# Patient Record
Sex: MALE | Race: WHITE | NOT HISPANIC OR LATINO | Employment: OTHER | URBAN - METROPOLITAN AREA
[De-identification: names, ages, dates, MRNs, and addresses within clinical notes are randomized per-mention and may not be internally consistent; named-entity substitution may affect disease eponyms.]

---

## 2017-01-04 ENCOUNTER — TELEPHONE (OUTPATIENT)
Dept: NEUROLOGY | Facility: CLINIC | Age: 67
End: 2017-01-04

## 2017-01-04 DIAGNOSIS — R13.12 OROPHARYNGEAL DYSPHAGIA: ICD-10-CM

## 2017-01-04 NOTE — TELEPHONE ENCOUNTER
----- Message from Kali Goldsmith MA sent at 1/4/2017  2:16 PM CST -----      ----- Message -----     From: Sarah Coles     Sent: 1/4/2017   2:05 PM       To: Lena MCKEE Staff    [facility] /requesting a modified barium swallow order (scheduled for 01/11/17)  faxed [phone]/ karen Cochran / [phone]

## 2017-01-11 ENCOUNTER — TELEPHONE (OUTPATIENT)
Dept: NEUROLOGY | Facility: CLINIC | Age: 67
End: 2017-01-11

## 2017-01-11 NOTE — TELEPHONE ENCOUNTER
----- Message from Geeta López sent at 1/11/2017  9:28 AM CST -----  Contact: [facility]- [place]-   [phone]  The nurse need additional information for speech therapy for the above listed patient. Fax [phone]!

## 2017-01-11 NOTE — TELEPHONE ENCOUNTER
----- Message from Sarah Coles sent at 1/10/2017  3:54 PM CST -----  Pt requesting to speak to nurse about orders for Barium swallow test / call Marissa [phone] ... [facility] / will need orders

## 2017-04-06 ENCOUNTER — OFFICE VISIT (OUTPATIENT)
Dept: NEUROLOGY | Facility: CLINIC | Age: 67
End: 2017-04-06
Payer: MEDICARE

## 2017-04-06 VITALS
WEIGHT: 155.44 LBS | BODY MASS INDEX: 23.02 KG/M2 | DIASTOLIC BLOOD PRESSURE: 91 MMHG | HEIGHT: 69 IN | SYSTOLIC BLOOD PRESSURE: 143 MMHG | HEART RATE: 61 BPM

## 2017-04-06 DIAGNOSIS — R47.1 DYSARTHRIA: ICD-10-CM

## 2017-04-06 DIAGNOSIS — R13.12 OROPHARYNGEAL DYSPHAGIA: ICD-10-CM

## 2017-04-06 DIAGNOSIS — G20.A1 PD (PARKINSON'S DISEASE): Primary | ICD-10-CM

## 2017-04-06 DIAGNOSIS — R41.3 MEMORY LOSS: ICD-10-CM

## 2017-04-06 PROCEDURE — 99999 PR PBB SHADOW E&M-EST. PATIENT-LVL III: CPT | Mod: PBBFAC,,, | Performed by: PSYCHIATRY & NEUROLOGY

## 2017-04-06 PROCEDURE — 99213 OFFICE O/P EST LOW 20 MIN: CPT | Mod: PBBFAC,PN | Performed by: PSYCHIATRY & NEUROLOGY

## 2017-04-06 PROCEDURE — 99214 OFFICE O/P EST MOD 30 MIN: CPT | Mod: S$PBB,,, | Performed by: PSYCHIATRY & NEUROLOGY

## 2017-04-06 RX ORDER — BUDESONIDE AND FORMOTEROL FUMARATE DIHYDRATE 80; 4.5 UG/1; UG/1
2 AEROSOL RESPIRATORY (INHALATION) 2 TIMES DAILY
COMMUNITY
Start: 2017-01-30 | End: 2021-06-16

## 2017-04-06 RX ORDER — CARBIDOPA AND LEVODOPA 50; 200 MG/1; MG/1
1 TABLET, EXTENDED RELEASE ORAL 4 TIMES DAILY
Qty: 360 TABLET | Refills: 3 | Status: SHIPPED | OUTPATIENT
Start: 2017-04-06 | End: 2017-04-06 | Stop reason: SDUPTHER

## 2017-04-06 RX ORDER — BENZTROPINE MESYLATE 0.5 MG/1
0.25 TABLET ORAL 2 TIMES DAILY
Qty: 90 TABLET | Refills: 4 | Status: SHIPPED | OUTPATIENT
Start: 2017-04-06 | End: 2017-10-06 | Stop reason: SDUPTHER

## 2017-04-06 RX ORDER — CARBIDOPA AND LEVODOPA 50; 200 MG/1; MG/1
1 TABLET, EXTENDED RELEASE ORAL 4 TIMES DAILY
Qty: 360 TABLET | Refills: 3 | Status: SHIPPED | OUTPATIENT
Start: 2017-04-06 | End: 2017-06-03 | Stop reason: SDUPTHER

## 2017-04-06 RX ORDER — PNV NO.95/FERROUS FUM/FOLIC AC 28MG-0.8MG
100 TABLET ORAL DAILY
COMMUNITY
End: 2017-07-05

## 2017-04-06 NOTE — PROGRESS NOTES
"Last Name: Sunita LOMBARDO (pe-teet). Chief Complaints during this visit:   f/u visit for PD     History of present illness:   67 y.o. M returns in f/u for PD.  Accompanied by wife. He states that overall, he is feeling fair. Tremor is not bad but balance seems to be doing worse. Went to physical therapy this morning. Has order to continue for another 8 weeks.   Memory is not doing to good. Having difficulty remembering peoples names and addresses.   Having some trouble sleeping too. Hard to fall asleep.   Dropping things and having touble buttoning buttons   Gets an occasional scary look in his eyes.  He and his wife state they never got the results of his swallowing study  Still having persistent tongue protrusion.        Interval history 1/4/17:  No complaints.  His main complaint is fatigue and also "feeling fatigue" when he gets out in the sun.  No falls since last seen.  Stumbles, but no falls.  Sleeping "oK', though he lays in bed awake for a couple hours.  Sleeps better in easy chair.  Gave his guitar away as was too hard to play.  Has more trouble making decisions.  Has cravings for sweets.      Interval history 8/5/16:  Never took the remeron because they didn't want to take more medication.  Tremors come/go.  Weight is back up.  Goes to therapy 3x/week.  If he doesn't he gets too stiff.  Back pain, daily.  Relieved by sitting down.  Easily fatigued.    From my note 4/8/16:  Continues finds the sinemet CR better for him, better control of tremors.  Balance still an issue, but no falls.  Reluctant to use a cane.  Difficult to fall asleep at night.  Active dreaming.  No memory problems and no hallucinations.    Has episodes of staring.    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  Lost some weight.  During month in hospital, though, he was not shaking.  Now they've come back "twice as bad."    Oxycodone made him hallucinate, so stopped after a week (during hospitalization).      II. " "Review of systems As in HPI, otherwise, balance 3  systems reviewed and are negative.   III. Past Medical history: PD left tremor-type 2009 (Dr. Ramos dx); "CVA" 1998; Right shoulder dyskinesia vs tic since at least 1994; COPD; HTN   Family history: brother with tics?   Social history: No tob or etoh, lives with family, , disabled .  Enjoys hunting and fishing.    Current neuro medications: benztropine 0.5mg BID, amantadine 100qam, carbidopa-levodopa 50/200 tid  Allergies: penicillin, mirapex caused compulsive gambling     IV. Physical Exam (Includes Part III of Unified Parkinsons Disease Rating Scale 2008)       Vitals:    04/06/17 1548   BP: (!) 143/91   BP Location: Left arm   Patient Position: Sitting   BP Method: Automatic   Pulse: 61   Weight: 70.5 kg (155 lb 6.8 oz)   Height: 5' 9" (1.753 m)     Wt Readings from Last 5 Encounters:   04/06/17 70.5 kg (155 lb 6.8 oz)   12/01/16 71 kg (156 lb 8.4 oz)   08/05/16 70 kg (154 lb 5.2 oz)   04/08/16 70 kg (154 lb 5.2 oz)   11/17/15 69.1 kg (152 lb 5.4 oz)     General appearance: Well nourished, well developed, no acute distress   -------------------------------------------------------------   Facial Expression: "poker face" (1)   Affect: full   Orientation to time & place: Oriented to time, place, person and situation   Speech: Slight loss of expression, diction or tone (1)     UPDRS Motor Examination      Speech  1 - Slight loss of expression, dictation, and/or volumn.   Facial Expression  2 - Slight but definitely abnormal diminution of facial expression.    Rigidity     Neck 1 - Slight or detectable only when activated by mirror or other movements.   Upper Extremity: Right 0 - Absent.   Upper Extremity: Left 1 - Slight or detectable only when activated by mirror or other movements.   Lower Extremity: Right 0 - Absent.   Lower Extremity: Left 0 - Absent.     Finger Taps      right 1 - Mild slowing and/or reduction in amplitude.   left 2 - Moderately " impaired. Definite and early fatiguing. May have occasional arrests in movement.   Hand Movements      right 1 - Mild slowing and/or reduction in amplitude.   left 2 - Moderately impaired. Definite and early fatiguing. May have occasional arrests in movement.   Pronation/supination of Hands      right 1 - Mild slowing and/or reduction in amplitude.   left 2 - Moderately impaired. Definite and early fatiguing. May have occasional arrests in movement.     Toe Tapping    right 1 - Mild slowing and/or reduction in amplitude.   left 2 - Moderately impaired. Definite and early fatiguing. May have occasional arrests in movement.     Leg Agility      right 1 - Mild slowing and/or reduction in amplitude.   left 2 - Moderately impaired. Definite and early fatiguing. May have occasional arrests in movement.   Arising from Chair  2 - Pushes self up from arms of seat.   Posture  1 - Not quite erect, slightly stooped posture; could be normal for older person.   Gait  2 - Walks with difficulty, but requires little or no assistance; may have some festination, short steps, or propulsion.   Freezing of gait 0   Postural Stability (Response to sudden, strong posterior displacement produced by pull on shoulders while patient erect with eyes open and feet slightly apart.   Deferred   Body Bradykinesia and Hypokinesia (Combining slowness, hesitancy, decreased armswing, small amplitude, and poverty of movement in general)  2 - Mild degree of slowness and poverty of movement which is definitely abnormal.     Tremor at Rest:      Face, lips, chin 0 - Absent.    Hands:      right 0 - Absent.    left 2 - Mild in amplitude and persistent. Or moderate in amplitude, but only intermittently present.    Feet:     right 0 - Absent.    left 0 - Absent.    Constancy of REST tremor: None   Postural tremor:    right 0 - Absent.    left 0 - Absent.    Kinetic tremor:    right 0 - Absent.    left 0 - Absent.    Dyskinesias present? No       Total Motor  UPDRS:  29        V. Laboratory/ Radiological Data:     Lab Results   Component Value Date    [id number] 304 04/08/2016     B1 61  spep normal    VI. Assessment and Plan    Problem List Items Addressed This Visit     PD (Parkinson's disease) - Primary    Overview     classic type, left-predominant tremor         Current Assessment & Plan     Continues to appear under-dosed.   -> increase sinemet to qid (6/11/3/7)   -> not a dbs candidate as no indications (wife asked)         Relevant Medications    benztropine (COGENTIN) 0.5 MG tablet    carbidopa-levodopa  mg (SINEMET CR)  mg TbSR    Oropharyngeal dysphagia    Relevant Orders    Ambulatory consult to Speech Therapy    Memory loss    Dysarthria    Current Assessment & Plan     Never got the ST that I ordered.   -> re-order ST through [facility] in [place].fax [phone]                 Return in about 3 months (around 7/6/2017) for PD.

## 2017-04-06 NOTE — ASSESSMENT & PLAN NOTE
Continues to appear under-dosed.   -> increase sinemet to qid (6/11/3/7)   -> not a dbs candidate as no indications (wife asked)

## 2017-04-06 NOTE — PATIENT INSTRUCTIONS
04/06/2017    Dear Yannick Dorsey,    Due to overwhelming demand, my [place] clinic location books to capacity very quickly every month.      We have two Nurse Practitioners, Ngozi Julian and Asya Selby, who also see patients with Memory and Movement Disorders in [place].  And when possible, please consider making your appointment in [place], where we have more appointments available in the Movements Disorders Division.       I'd like to see you, again, in either July or August.  However, we have no available appointments at this time.      Please CALL my office in [place] if you have NOT heard from us before June 1, 2017.      Sincerely,       Cassia Paredes MD  Director, Movement Disorders and DBS Program  Department of Neurology  [phone]

## 2017-04-06 NOTE — ASSESSMENT & PLAN NOTE
Never got the ST that I ordered.   -> re-order ST through [facility] in [place].fax [phone]

## 2017-04-06 NOTE — MR AVS SNAPSHOT
[facility]  [address]  [place] [address]  Phone: [phone]  Fax: [phone]                  Yannick Dorsey   2017 3:40 PM   Office Visit    Description:  Male : 1950   Provider:  Cassia Paredes MD   Department:  [place] - Neurology           Reason for Visit     Parkinson's Disease           Diagnoses this Visit        Comments    Oropharyngeal dysphagia    -  Primary     PD (Parkinson's disease)                To Do List           Goals (5 Years of Data)     None      Follow-Up and Disposition     Return in about 3 months (around 2017) for PD.       These Medications        Disp Refills Start End    benztropine (COGENTIN) 0.5 MG tablet 90 tablet 4 2017     Take 0.5 tablets (0.25 mg total) by mouth 2 (two) times daily. - Oral    Pharmacy: [facility] [id number] [place] [address] AT [facility] Ph #: [phone]       carbidopa-levodopa  mg (SINEMET CR)  mg TbSR 360 tablet 3 2017     Take 1 tablet by mouth 4 (four) times daily. - Oral    Pharmacy: [facility] [id number] [place] [address] AT [facility] Ph #: [phone]         [facility]     [facility] Nurse Care Line -  Assistance  Unless otherwise directed by your provider, please contact [facility], our nurse care line that is available for  assistance.     Registered nurses in the [facility] provide: appointment scheduling, clinical advisement, health education, and other advisory services.  Call: [phone] (toll free)               Medications           Message regarding Medications     Verify the changes and/or additions to your medication regime listed below are the same as discussed with your clinician today.  If any of these changes or additions are incorrect, please notify your healthcare provider.        CHANGE how you are taking these  medications     Start Taking Instead of    benztropine (COGENTIN) 0.5 MG tablet benztropine (COGENTIN) 0.5 MG tablet    Dosage:  Take 0.5 tablets (0.25 mg total) by mouth 2 (two) times daily. Dosage:  Take 1 tablet by mouth  twice a day    Reason for Change:  Reorder     carbidopa-levodopa  mg (SINEMET CR)  mg TbSR carbidopa-levodopa  mg (SINEMET CR)  mg TbSR    Dosage:  Take 1 tablet by mouth 4 (four) times daily. Dosage:  Take 1 tablet by mouth 3 (three) times daily.    Reason for Change:  Reorder       STOP taking these medications     mirtazapine (REMERON) 15 MG tablet Take 1 tablet (15 mg total) by mouth every evening.           Verify that the below list of medications is an accurate representation of the medications you are currently taking.  If none reported, the list may be blank. If incorrect, please contact your healthcare provider. Carry this list with you in case of emergency.           Current Medications     amantadine HCl 100 mg Tab Take 1 tablet by mouth  daily    aspirin (ECOTRIN) 81 MG EC tablet Take 81 mg by mouth once daily.    benztropine (COGENTIN) 0.5 MG tablet Take 0.5 tablets (0.25 mg total) by mouth 2 (two) times daily.    carbidopa-levodopa  mg (SINEMET CR)  mg TbSR Take 1 tablet by mouth 4 (four) times daily.    cyanocobalamin (VITAMIN B-12) 100 MCG tablet Take 100 mcg by mouth once daily.    fluticasone-salmeterol (ADVAIR DISKUS) 250-50 mcg/dose diskus inhaler Inhale 1 puff into the lungs 2 (two) times daily. Disk with Device Inhalation    hydrALAZINE (APRESOLINE) 100 MG tablet Take 100 mg by mouth 2 (two) times daily. Take 0.5 tablet BID    PROAIR HFA 90 mcg/actuation inhaler Inhale into the lungs 4 times daily as needed.    vitamin D 1000 units Tab Take 185 mg by mouth once daily.    SYMBICORT 80-4.5 mcg/actuation HFAA            Clinical Reference Information           Your Vitals Were     BP Pulse Height Weight BMI    143/91 (BP Location: Left  "arm, Patient Position: Sitting, BP Method: Automatic) 61 5' 9" (1.753 m) 70.5 kg (155 lb 6.8 oz) 22.95 kg/m2      Blood Pressure          Most Recent Value    BP  (!)  143/91      Allergies as of 4/6/2017     Penicillins    Pramipexole      Immunizations Administered on Date of Encounter - 4/6/2017     None      Orders Placed During Today's Visit      Normal Orders This Visit    Ambulatory consult to Speech Therapy       Instructions    04/06/2017    Dear Yannick Dorsey,    Due to overwhelming demand, my [place] clinic location books to capacity very quickly every month.      We have two Nurse Practitioners, Ngozi Julian and Asya Selby, who also see patients with Memory and Movement Disorders in [place].  And when possible, please consider making your appointment in [place], where we have more appointments available in the Movements Disorders Division.       I'd like to see you, again, in either July or August.  However, we have no available appointments at this time.      Please CALL my office in [place] if you have NOT heard from us before June 1, 2017.      Sincerely,       Cassia Paredes MD  Director, Movement Disorders and DBS Program  Department of Neurology  [phone]           Language Assistance Services     ATTENTION: Language assistance services are available, free of charge. Please call [phone].      ATENCIÓN: Si habla louann, tiene a pete disposición servicios gratuitos de asistencia lingüística. Llame al [phone].     [facility] Ý: N?u b?n nói Ti?ng Vi?t, có các d?ch v? h? tr? ngôn ng? mi?n phí dành cho b?n. G?i s? [phone].         [facility] complies with applicable Federal civil rights laws and does not discriminate on the basis of race, color, national origin, age, disability, or sex.        "

## 2017-06-02 DIAGNOSIS — G20.A1 PD (PARKINSON'S DISEASE): ICD-10-CM

## 2017-06-03 RX ORDER — CARBIDOPA AND LEVODOPA 50; 200 MG/1; MG/1
1 TABLET, EXTENDED RELEASE ORAL 4 TIMES DAILY
Qty: 360 TABLET | Refills: 3 | Status: SHIPPED | OUTPATIENT
Start: 2017-06-03 | End: 2018-06-28 | Stop reason: SDUPTHER

## 2017-06-03 RX ORDER — CARBIDOPA AND LEVODOPA 50; 200 MG/1; MG/1
TABLET, EXTENDED RELEASE ORAL
Qty: 270 TABLET | OUTPATIENT
Start: 2017-06-03

## 2017-06-07 ENCOUNTER — TELEPHONE (OUTPATIENT)
Dept: CARDIOLOGY | Facility: CLINIC | Age: 67
End: 2017-06-07

## 2017-06-07 ENCOUNTER — TELEPHONE (OUTPATIENT)
Dept: NEUROLOGY | Facility: CLINIC | Age: 67
End: 2017-06-07

## 2017-06-07 NOTE — TELEPHONE ENCOUNTER
----- Message from Mya Frederick sent at 6/7/2017  4:07 PM CDT -----  Contact: Marissa Rico   Wife called regarding the patient medication, benztropine (COGENTIN) 0.5 MG tablet, need to verify dosage. Please contact [phone] (home)

## 2017-06-07 NOTE — TELEPHONE ENCOUNTER
Advised Ms. Marissa that the patient is currently scheduled for [place] with Dr. Paredes on 7/5/17 @ 2:20pm. We also confirmed that Mr. Lanier should be taking 1/2 tablet of the 0.5 mg cogentin tablet in the morning and the other half at night. The dividing of the tablet into 0.25mg doses was causing concern for Ms. Marissa of the accuracy of how he had been taking the med.

## 2017-06-07 NOTE — TELEPHONE ENCOUNTER
----- Message from Mya Frederick sent at 6/7/2017  4:11 PM CDT -----  Contact: Marissa Dorsey  Wife called regarding scheduling the patient a f/u appt in [place]. Please contact [phone] (home)

## 2017-07-05 ENCOUNTER — OFFICE VISIT (OUTPATIENT)
Dept: NEUROLOGY | Facility: CLINIC | Age: 67
End: 2017-07-05
Payer: MEDICARE

## 2017-07-05 VITALS
WEIGHT: 156.75 LBS | BODY MASS INDEX: 23.22 KG/M2 | DIASTOLIC BLOOD PRESSURE: 82 MMHG | HEART RATE: 57 BPM | SYSTOLIC BLOOD PRESSURE: 132 MMHG | HEIGHT: 69 IN

## 2017-07-05 DIAGNOSIS — R26.89 BALANCE DISORDER: ICD-10-CM

## 2017-07-05 DIAGNOSIS — F51.01 PRIMARY INSOMNIA: ICD-10-CM

## 2017-07-05 DIAGNOSIS — G20.A1 PD (PARKINSON'S DISEASE): Primary | ICD-10-CM

## 2017-07-05 PROCEDURE — 99214 OFFICE O/P EST MOD 30 MIN: CPT | Mod: S$PBB,,, | Performed by: PSYCHIATRY & NEUROLOGY

## 2017-07-05 PROCEDURE — 99213 OFFICE O/P EST LOW 20 MIN: CPT | Mod: PBBFAC,PN | Performed by: PSYCHIATRY & NEUROLOGY

## 2017-07-05 PROCEDURE — 99999 PR PBB SHADOW E&M-EST. PATIENT-LVL III: CPT | Mod: PBBFAC,,, | Performed by: PSYCHIATRY & NEUROLOGY

## 2017-07-05 RX ORDER — HYDRALAZINE HYDROCHLORIDE 25 MG/1
25 TABLET, FILM COATED ORAL 2 TIMES DAILY
COMMUNITY
Start: 2017-06-02 | End: 2018-02-26

## 2017-07-05 RX ORDER — OMEPRAZOLE 20 MG/1
20 CAPSULE, DELAYED RELEASE ORAL DAILY
COMMUNITY
Start: 2017-06-02 | End: 2018-02-26

## 2017-07-05 RX ORDER — DIAZEPAM 2 MG/1
1 TABLET ORAL NIGHTLY PRN
Qty: 15 TABLET | Refills: 0 | Status: SHIPPED | OUTPATIENT
Start: 2017-07-05 | End: 2018-02-26

## 2017-07-05 NOTE — PROGRESS NOTES
"Last Name: Sunita LOMBARDO (pe-teet). Chief Complaints during this visit:   f/u visit for PD     History of present illness:   67 y.o. M returns in f/u for PD.  Accompanied by wife.  Taking meds regular.  Main complaint is his sleep.  If he sleeps poorly, then he is spacey the next day.    Balance is off, but no falls.      Interval history 4/6/17:  He states that overall, he is feeling fair. Tremor is not bad but balance seems to be doing worse. Went to physical therapy this morning. Has order to continue for another 8 weeks.   Memory is not doing to good. Having difficulty remembering peoples names and addresses.   Having some trouble sleeping too. Hard to fall asleep.   Dropping things and having touble buttoning buttons   Gets an occasional scary look in his eyes.  He and his wife state they never got the results of his swallowing study  Still having persistent tongue protrusion.      Interval history 1/4/17:  No complaints.  His main complaint is fatigue and also "feeling fatigue" when he gets out in the sun.  No falls since last seen.  Stumbles, but no falls.  Sleeping "oK', though he lays in bed awake for a couple hours.  Sleeps better in easy chair.  Gave his guitar away as was too hard to play.  Has more trouble making decisions.  Has cravings for sweets.    Interval history 8/5/16:  Never took the remeron because they didn't want to take more medication.  Tremors come/go.  Weight is back up.  Goes to therapy 3x/week.  If he doesn't he gets too stiff.  Back pain, daily.  Relieved by sitting down.  Easily fatigued.    From my note 4/8/16:  Continues finds the sinemet CR better for him, better control of tremors.  Balance still an issue, but no falls.  Reluctant to use a cane.  Difficult to fall asleep at night.  Active dreaming.  No memory problems and no hallucinations.    Has episodes of staring.    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  Lost some weight.  During month in " "hospital, though, he was not shaking.  Now they've come back "twice as bad."    Oxycodone made him hallucinate, so stopped after a week (during hospitalization).      II. Review of systems As in HPI, otherwise, balance 3  systems reviewed and are negative.   III. Past Medical history: PD left tremor-type 2009 (Dr. Ramos dx); "CVA" 1998; Right shoulder dyskinesia vs tic since at least 1994; COPD; HTN   Family history: brother with tics?   Social history: No tob or etoh, lives with family, , disabled .  Enjoys hunting and fishing.    Current neuro medications: benztropine 0.5mg BID, amantadine 100qam, carbidopa-levodopa 50/200 tid  Allergies: penicillin, mirapex caused compulsive gambling     IV. Physical Exam (Includes Part III of Unified Parkinsons Disease Rating Scale 2008)       Vitals:    07/05/17 1428   BP: 132/82   BP Location: Left arm   Patient Position: Sitting   BP Method: Automatic   Pulse: (!) 57   Weight: 71.1 kg (156 lb 12 oz)   Height: 5' 9" (1.753 m)     Wt Readings from Last 5 Encounters:   07/05/17 71.1 kg (156 lb 12 oz)   04/06/17 70.5 kg (155 lb 6.8 oz)   12/01/16 71 kg (156 lb 8.4 oz)   08/05/16 70 kg (154 lb 5.2 oz)   04/08/16 70 kg (154 lb 5.2 oz)     General appearance: Well nourished, well developed, no acute distress   -------------------------------------------------------------   Facial Expression: "poker face" (1)   Affect: full   Orientation to time & place: Oriented to time, place, person and situation   Speech: Slight loss of expression, diction or tone (1)     UPDRS Motor Examination      Speech  1 - Slight loss of expression, dictation, and/or volumn.   Facial Expression  2 - Slight but definitely abnormal diminution of facial expression.    Rigidity     Neck 1 - Slight or detectable only when activated by mirror or other movements.   Upper Extremity: Right 0 - Absent.   Upper Extremity: Left 1 - Slight or detectable only when activated by mirror or other movements. "   Lower Extremity: Right 0 - Absent.   Lower Extremity: Left 0 - Absent.     Finger Taps      right 1 - Mild slowing and/or reduction in amplitude.   left 2 - Moderately impaired. Definite and early fatiguing. May have occasional arrests in movement.   Hand Movements      right 1 - Mild slowing and/or reduction in amplitude.   left 2 - Moderately impaired. Definite and early fatiguing. May have occasional arrests in movement.   Pronation/supination of Hands      right 1 - Mild slowing and/or reduction in amplitude.   left 2 - Moderately impaired. Definite and early fatiguing. May have occasional arrests in movement.     Toe Tapping    right 1 - Mild slowing and/or reduction in amplitude.   left 2 - Moderately impaired. Definite and early fatiguing. May have occasional arrests in movement.     Leg Agility      right 1 - Mild slowing and/or reduction in amplitude.   left 2 - Moderately impaired. Definite and early fatiguing. May have occasional arrests in movement.   Arising from Chair  2 - Pushes self up from arms of seat.   Posture  1 - Not quite erect, slightly stooped posture; could be normal for older person.   Gait  2 - Walks with difficulty, but requires little or no assistance; may have some festination, short steps, or propulsion.   Freezing of gait 0   Postural Stability (Response to sudden, strong posterior displacement produced by pull on shoulders while patient erect with eyes open and feet slightly apart.   Deferred   Body Bradykinesia and Hypokinesia (Combining slowness, hesitancy, decreased armswing, small amplitude, and poverty of movement in general)  2 - Mild degree of slowness and poverty of movement which is definitely abnormal.     Tremor at Rest:      Face, lips, chin 0 - Absent.    Hands:      right 0 - Absent.    left 2 - Mild in amplitude and persistent. Or moderate in amplitude, but only intermittently present.    Feet:     right 0 - Absent.    left 0 - Absent.    Constancy of REST tremor:  2: Mild: Tremor at rest is present 26-50% of the entire examination period.    Postural tremor:    right 0 - Absent.    left 0 - Absent.    Kinetic tremor:    right 0 - Absent.    left 0 - Absent.    Dyskinesias present? No       Total Motor UPDRS:  29        V. Laboratory/ Radiological Data:     Lab Results   Component Value Date    [id number] 304 04/08/2016     B1 61  spep normal    VI. Assessment and Plan    Problem List Items Addressed This Visit        1 - High    PD (Parkinson's disease) - Primary    Overview     classic type, left-predominant tremor         Current Assessment & Plan     Stable, actually looks really good today (appropriately dosed).   -> no changes to medicaitons            2     Primary insomnia    Current Assessment & Plan     Intermittent difficulty falling asleep.   -> start melatonin nightly and valium prn         Relevant Medications    diazePAM (VALIUM) 2 MG tablet    Balance disorder      Other Visit Diagnoses    None.           Return in about 3 months (around 10/5/2017) for PD.

## 2017-07-05 NOTE — PATIENT INSTRUCTIONS
Please try melatonin 5mg at night every night, for sleep.  You can also take a valium at 1am, if you are still awake.

## 2017-08-23 RX ORDER — BENZTROPINE MESYLATE 0.5 MG/1
TABLET ORAL
Qty: 180 TABLET | Refills: 3 | Status: SHIPPED | OUTPATIENT
Start: 2017-08-23 | End: 2017-10-06 | Stop reason: SDUPTHER

## 2017-09-20 ENCOUNTER — TELEPHONE (OUTPATIENT)
Dept: NEUROLOGY | Facility: CLINIC | Age: 67
End: 2017-09-20

## 2017-09-20 NOTE — TELEPHONE ENCOUNTER
----- Message from Sarah Rudolph sent at 9/20/2017 12:03 PM CDT -----  Contact: Marissa (Wife) [phone]   Marissa called to speak to someone regarding changing one of the the patient prescriptions. The patient is requesting to have the benztropine (COGENTIN) 0.5 MG tablet prescription change to a liquid if possible. There's zero co-pay for the liquid version.Please contact Marissa at [phone] or [phone] to discuss further.      [facility] - [address]  [address]  [facility] [address]  Phone: [phone] Fax: [phone]

## 2017-09-20 NOTE — TELEPHONE ENCOUNTER
Called and left a message for  regarding her 's medication. I stated to call our office back regarding this matter.

## 2017-10-06 ENCOUNTER — OFFICE VISIT (OUTPATIENT)
Dept: NEUROLOGY | Facility: CLINIC | Age: 67
End: 2017-10-06
Payer: MEDICARE

## 2017-10-06 VITALS
HEIGHT: 69 IN | SYSTOLIC BLOOD PRESSURE: 142 MMHG | BODY MASS INDEX: 23.6 KG/M2 | DIASTOLIC BLOOD PRESSURE: 87 MMHG | HEART RATE: 63 BPM | WEIGHT: 159.38 LBS

## 2017-10-06 DIAGNOSIS — R47.1 DYSARTHRIA: ICD-10-CM

## 2017-10-06 DIAGNOSIS — G20.A1 PD (PARKINSON'S DISEASE): Primary | ICD-10-CM

## 2017-10-06 PROCEDURE — 99213 OFFICE O/P EST LOW 20 MIN: CPT | Mod: PBBFAC,PN | Performed by: PSYCHIATRY & NEUROLOGY

## 2017-10-06 PROCEDURE — 99214 OFFICE O/P EST MOD 30 MIN: CPT | Mod: S$PBB,,, | Performed by: PSYCHIATRY & NEUROLOGY

## 2017-10-06 PROCEDURE — 99999 PR PBB SHADOW E&M-EST. PATIENT-LVL III: CPT | Mod: PBBFAC,,, | Performed by: PSYCHIATRY & NEUROLOGY

## 2017-10-06 RX ORDER — AMANTADINE HYDROCHLORIDE 50 MG/5ML
100 SOLUTION ORAL DAILY
Qty: 900 ML | Refills: 4 | Status: SHIPPED | OUTPATIENT
Start: 2017-10-06 | End: 2018-10-14 | Stop reason: SDUPTHER

## 2017-10-06 RX ORDER — BENZTROPINE MESYLATE 0.5 MG/1
0.5 TABLET ORAL 2 TIMES DAILY
Qty: 180 TABLET | Refills: 4 | Status: SHIPPED | OUTPATIENT
Start: 2017-10-06 | End: 2017-11-07 | Stop reason: SDUPTHER

## 2017-10-06 NOTE — PROGRESS NOTES
"Last Name: Sunita LOMBARDO (pe-teet). Chief Complaints during this visit:   f/u visit for PD     History of present illness:   67 y.o. M returns in f/u for PD.  Accompanied by wife.  Feels like he is doing ok, over-all, just losing a little more balance.  No falls.  Sleep is better.    Speech is getting harder to understand.  Insurer won't cover any more ST this year.  No choking.    Would like to switch to liquid amantadine as free!  Amantadine capsules are $90.    Interval history 7/5/17:  Taking meds regular.  Main complaint is his sleep.  If he sleeps poorly, then he is spacey the next day.  Balance is off, but no falls.    Interval history 4/6/17:  He states that overall, he is feeling fair. Tremor is not bad but balance seems to be doing worse. Went to physical therapy this morning. Has order to continue for another 8 weeks.   Memory is not doing to good. Having difficulty remembering peoples names and addresses.   Having some trouble sleeping too. Hard to fall asleep.   Dropping things and having touble buttoning buttons   Gets an occasional scary look in his eyes.  He and his wife state they never got the results of his swallowing study  Still having persistent tongue protrusion.      Interval history 1/4/17:  No complaints.  His main complaint is fatigue and also "feeling fatigue" when he gets out in the sun.  No falls since last seen.  Stumbles, but no falls.  Sleeping "oK', though he lays in bed awake for a couple hours.  Sleeps better in easy chair.  Gave his guitar away as was too hard to play.  Has more trouble making decisions.  Has cravings for sweets.    Interval history 8/5/16:  Never took the remeron because they didn't want to take more medication.  Tremors come/go.  Weight is back up.  Goes to therapy 3x/week.  If he doesn't he gets too stiff.  Back pain, daily.  Relieved by sitting down.  Easily fatigued.    From my note 4/8/16:  Continues finds the sinemet CR better for him, better control of " "tremors.  Balance still an issue, but no falls.  Reluctant to use a cane.  Difficult to fall asleep at night.  Active dreaming.  No memory problems and no hallucinations.    Has episodes of staring.    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  Lost some weight.  During month in hospital, though, he was not shaking.  Now they've come back "twice as bad."    Oxycodone made him hallucinate, so stopped after a week (during hospitalization).      II. Review of systems As in HPI, otherwise, balance 3  systems reviewed and are negative.   III. Past Medical history: PD left tremor-type 2009 (Dr. Ramos dx); "CVA" 1998; Right shoulder dyskinesia vs tic since at least 1994; COPD; HTN   Family history: brother with tics?   Social history: No tob or etoh, lives with family, , disabled .  Enjoys hunting and fishing.    Current neuro medications: benztropine 0.5mg BID, amantadine 100qam, carbidopa-levodopa 50/200 tid  Allergies: penicillin, mirapex caused compulsive gambling     IV. Physical Exam (Includes Part III of Unified Parkinsons Disease Rating Scale 2008)       Vitals:    10/06/17 1500   BP: (!) 142/87   BP Location: Left arm   Patient Position: Sitting   BP Method: Medium (Automatic)   Pulse: 63   Weight: 72.3 kg (159 lb 6.3 oz)   Height: 5' 9" (1.753 m)     Wt Readings from Last 5 Encounters:   10/06/17 72.3 kg (159 lb 6.3 oz)   07/05/17 71.1 kg (156 lb 12 oz)   04/06/17 70.5 kg (155 lb 6.8 oz)   12/01/16 71 kg (156 lb 8.4 oz)   08/05/16 70 kg (154 lb 5.2 oz)     General appearance: Well nourished, well developed, no acute distress   -------------------------------------------------------------   Facial Expression: "poker face" (1)   Affect: full   Orientation to time & place: Oriented to time, place, person and situation   Speech: Slight loss of expression, diction or tone (1)     UPDRS Motor Examination      Speech  1 - Slight loss of expression, dictation, and/or volumn. "   Facial Expression  2 - Slight but definitely abnormal diminution of facial expression.    Rigidity     Neck 1 - Slight or detectable only when activated by mirror or other movements.   Upper Extremity: Right 0 - Absent.   Upper Extremity: Left 1 - Slight or detectable only when activated by mirror or other movements.   Lower Extremity: Right 0 - Absent.   Lower Extremity: Left 0 - Absent.     Finger Taps      right 1 - Mild slowing and/or reduction in amplitude.   left 2 - Moderately impaired. Definite and early fatiguing. May have occasional arrests in movement.   Hand Movements      right 1 - Mild slowing and/or reduction in amplitude.   left 2 - Moderately impaired. Definite and early fatiguing. May have occasional arrests in movement.   Pronation/supination of Hands      right 1 - Mild slowing and/or reduction in amplitude.   left 2 - Moderately impaired. Definite and early fatiguing. May have occasional arrests in movement.     Toe Tapping    right 1 - Mild slowing and/or reduction in amplitude.   left 2 - Moderately impaired. Definite and early fatiguing. May have occasional arrests in movement.     Leg Agility      right 1 - Mild slowing and/or reduction in amplitude.   left 2 - Moderately impaired. Definite and early fatiguing. May have occasional arrests in movement.   Arising from Chair  2 - Pushes self up from arms of seat.   Posture  1 - Not quite erect, slightly stooped posture; could be normal for older person.   Gait  2 - Walks with difficulty, but requires little or no assistance; may have some festination, short steps, or propulsion.   Freezing of gait 0   Postural Stability (Response to sudden, strong posterior displacement produced by pull on shoulders while patient erect with eyes open and feet slightly apart.   Deferred   Body Bradykinesia and Hypokinesia (Combining slowness, hesitancy, decreased armswing, small amplitude, and poverty of movement in general)  2 - Mild degree of slowness and  poverty of movement which is definitely abnormal.     Tremor at Rest:      Face, lips, chin 0 - Absent.    Hands:      right 0 - Absent.    left 2 - Mild in amplitude and persistent. Or moderate in amplitude, but only intermittently present.    Feet:     right 0 - Absent.    left 0 - Absent.    Constancy of REST tremor: 2: Mild: Tremor at rest is present 26-50% of the entire examination period.    Postural tremor:    right 0 - Absent.    left 0 - Absent.    Kinetic tremor:    right 0 - Absent.    left 0 - Absent.    Dyskinesias present? No       Total Motor UPDRS:  29        V. Laboratory/ Radiological Data:     Lab Results   Component Value Date    [id number] 304 04/08/2016     B1 61  spep normal    VI. Assessment and Plan    Problem List Items Addressed This Visit     None      Visit Diagnoses    None.           No Follow-up on file.

## 2017-10-16 ENCOUNTER — TELEPHONE (OUTPATIENT)
Dept: NEUROLOGY | Facility: CLINIC | Age: 67
End: 2017-10-16

## 2017-10-31 ENCOUNTER — TELEPHONE (OUTPATIENT)
Dept: NEUROLOGY | Facility: CLINIC | Age: 67
End: 2017-10-31

## 2017-10-31 NOTE — TELEPHONE ENCOUNTER
Called and spoke to someone regarding  medication (AMANTADINE HCI). Stated that they just wanted to make sure that the doctor didn't make a mistake by change from pill from to liquid.

## 2017-10-31 NOTE — TELEPHONE ENCOUNTER
----- Message from Chelsi Fajardo sent at 10/31/2017  2:31 PM CDT -----  [facility] is questioning the medication amantadine HCl (SYMMETREL) 50 mg/5 mL Soln which was changed from pill to liquid by office. Please remit to:    [facility] - [address]  [address]  [facility] [address]  Phone: [phone] Fax: [phone]    Please call patient when completed at [phone].

## 2017-11-07 DIAGNOSIS — G20.A1 PD (PARKINSON'S DISEASE): ICD-10-CM

## 2017-11-07 RX ORDER — BENZTROPINE MESYLATE 0.5 MG/1
0.5 TABLET ORAL 2 TIMES DAILY
Qty: 180 TABLET | Refills: 3 | Status: SHIPPED | OUTPATIENT
Start: 2017-11-07 | End: 2018-12-26 | Stop reason: SDUPTHER

## 2017-12-20 ENCOUNTER — TELEPHONE (OUTPATIENT)
Dept: NEUROLOGY | Facility: CLINIC | Age: 67
End: 2017-12-20

## 2018-02-01 ENCOUNTER — OFFICE VISIT (OUTPATIENT)
Dept: NEUROLOGY | Facility: CLINIC | Age: 68
End: 2018-02-01
Payer: MEDICARE

## 2018-02-01 VITALS
HEIGHT: 69 IN | HEART RATE: 59 BPM | BODY MASS INDEX: 22.96 KG/M2 | SYSTOLIC BLOOD PRESSURE: 151 MMHG | DIASTOLIC BLOOD PRESSURE: 80 MMHG | WEIGHT: 155 LBS

## 2018-02-01 DIAGNOSIS — G31.84 MILD COGNITIVE IMPAIRMENT WITH MEMORY LOSS: ICD-10-CM

## 2018-02-01 DIAGNOSIS — G20.A1 PD (PARKINSON'S DISEASE): Primary | ICD-10-CM

## 2018-02-01 PROCEDURE — 99213 OFFICE O/P EST LOW 20 MIN: CPT | Mod: PBBFAC,PN | Performed by: PSYCHIATRY & NEUROLOGY

## 2018-02-01 PROCEDURE — 1126F AMNT PAIN NOTED NONE PRSNT: CPT | Mod: ,,, | Performed by: PSYCHIATRY & NEUROLOGY

## 2018-02-01 PROCEDURE — 99999 PR PBB SHADOW E&M-EST. PATIENT-LVL III: CPT | Mod: PBBFAC,,, | Performed by: PSYCHIATRY & NEUROLOGY

## 2018-02-01 PROCEDURE — 1159F MED LIST DOCD IN RCRD: CPT | Mod: ,,, | Performed by: PSYCHIATRY & NEUROLOGY

## 2018-02-01 PROCEDURE — 99214 OFFICE O/P EST MOD 30 MIN: CPT | Mod: S$PBB,,, | Performed by: PSYCHIATRY & NEUROLOGY

## 2018-02-01 RX ORDER — PNV NO.95/FERROUS FUM/FOLIC AC 28MG-0.8MG
100 TABLET ORAL DAILY
Qty: 30 TABLET | Refills: 11 | Status: SHIPPED | OUTPATIENT
Start: 2018-02-01

## 2018-02-01 NOTE — PROGRESS NOTES
"Last Name: Sunita LOMBARDO (pe-teet). Chief Complaints during this visit:   f/u visit for PD     History of present illness:   68 y.o. M returns in f/u for PD.  Accompanied by wife.  Balance is worsening.  No falls.  Would like to get PT.    Memory is worsening per patient, but wife says she doesn't feel there's much problem.    Renal insufficiency is now "above 2", so now on low phosphorus diet as of yesterday.  Wife worried as "everything I read about PD says you have to have protein."      Interval history 10/6/17:  Feels like he is doing ok, over-all, just losing a little more balance.  No falls.  Sleep is better.  Speech is getting harder to understand.  Insurer won't cover any more ST this year.  No choking.  Would like to switch to liquid amantadine as free!  Amantadine capsules are $90.    Interval history 7/5/17:  Taking meds regular.  Main complaint is his sleep.  If he sleeps poorly, then he is spacey the next day.  Balance is off, but no falls.    Interval history 4/6/17:  He states that overall, he is feeling fair. Tremor is not bad but balance seems to be doing worse. Went to physical therapy this morning. Has order to continue for another 8 weeks.   Memory is not doing to good. Having difficulty remembering peoples names and addresses.   Having some trouble sleeping too. Hard to fall asleep.   Dropping things and having touble buttoning buttons   Gets an occasional scary look in his eyes.  He and his wife state they never got the results of his swallowing study  Still having persistent tongue protrusion.      Interval history 1/4/17:  No complaints.  His main complaint is fatigue and also "feeling fatigue" when he gets out in the sun.  No falls since last seen.  Stumbles, but no falls.  Sleeping "oK', though he lays in bed awake for a couple hours.  Sleeps better in easy chair.  Gave his guitar away as was too hard to play.  Has more trouble making decisions.  Has cravings for sweets.    Interval " "history 8/5/16:  Never took the remeron because they didn't want to take more medication.  Tremors come/go.  Weight is back up.  Goes to therapy 3x/week.  If he doesn't he gets too stiff.  Back pain, daily.  Relieved by sitting down.  Easily fatigued.    From my note 4/8/16:  Continues finds the sinemet CR better for him, better control of tremors.  Balance still an issue, but no falls.  Reluctant to use a cane.  Difficult to fall asleep at night.  Active dreaming.  No memory problems and no hallucinations.    Has episodes of staring.    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  Lost some weight.  During month in hospital, though, he was not shaking.  Now they've come back "twice as bad."    Oxycodone made him hallucinate, so stopped after a week (during hospitalization).      II. Review of systems As in HPI, otherwise, balance 3  systems reviewed and are negative.   III. Past Medical history: PD left tremor-type 2009 (Dr. Ramos dx); "CVA" 1998; Right shoulder dyskinesia vs tic since at least 1994; COPD; HTN   Family history: brother with tics?   Social history: No tob or etoh, lives with family, , disabled .  Enjoys hunting and fishing.    Current neuro medications: benztropine 0.5mg BID, amantadine 100qam, carbidopa-levodopa 50/200 tid  Allergies: penicillin, mirapex caused compulsive gambling     IV. Physical Exam (Includes Part III of Unified Parkinsons Disease Rating Scale 2008)       Vitals:    02/01/18 0855   BP: (!) 151/80   Pulse: (!) 59   Weight: 70.3 kg (155 lb)   Height: 5' 9" (1.753 m)     Wt Readings from Last 5 Encounters:   02/01/18 70.3 kg (155 lb)   10/06/17 72.3 kg (159 lb 6.3 oz)   07/05/17 71.1 kg (156 lb 12 oz)   04/06/17 70.5 kg (155 lb 6.8 oz)   12/01/16 71 kg (156 lb 8.4 oz)     General appearance: Well nourished, well developed, no acute distress   -------------------------------------------------------------   Facial Expression: "poker face" (1) "   Affect: full   Orientation to time & place: Oriented to time, place, person and situation   Memory:  [place] Cognitive Assessment:   23/30  Speech: Slight loss of expression, diction or tone (1)     UPDRS Motor Examination      Speech  1 - Slight loss of expression, dictation, and/or volumn.   Facial Expression  2 - Slight but definitely abnormal diminution of facial expression.    Rigidity     Neck 1 - Slight or detectable only when activated by mirror or other movements.   Upper Extremity: Right 0 - Absent.   Upper Extremity: Left 1 - Slight or detectable only when activated by mirror or other movements.   Lower Extremity: Right 0 - Absent.   Lower Extremity: Left 0 - Absent.     Finger Taps      right 1 - Mild slowing and/or reduction in amplitude.   left 2 - Moderately impaired. Definite and early fatiguing. May have occasional arrests in movement.   Hand Movements      right 1 - Mild slowing and/or reduction in amplitude.   left 2 - Moderately impaired. Definite and early fatiguing. May have occasional arrests in movement.   Pronation/supination of Hands      right 1 - Mild slowing and/or reduction in amplitude.   left 2 - Moderately impaired. Definite and early fatiguing. May have occasional arrests in movement.     Toe Tapping    right 1 - Mild slowing and/or reduction in amplitude.   left 2 - Moderately impaired. Definite and early fatiguing. May have occasional arrests in movement.     Leg Agility      right 1 - Mild slowing and/or reduction in amplitude.   left 2 - Moderately impaired. Definite and early fatiguing. May have occasional arrests in movement.   Arising from Chair  2 - Pushes self up from arms of seat.   Posture  1 - Not quite erect, slightly stooped posture; could be normal for older person.   Gait  2 - Walks with difficulty, but requires little or no assistance; may have some festination, short steps, or propulsion.   Freezing of gait 0   Postural Stability (Response to sudden, strong  posterior displacement produced by pull on shoulders while patient erect with eyes open and feet slightly apart.   Deferred   Body Bradykinesia and Hypokinesia (Combining slowness, hesitancy, decreased armswing, small amplitude, and poverty of movement in general)  2 - Mild degree of slowness and poverty of movement which is definitely abnormal.     Tremor at Rest:      Face, lips, chin 0 - Absent.    Hands:      right 0 - Absent.    left 2 - Mild in amplitude and persistent. Or moderate in amplitude, but only intermittently present.    Feet:     right 0 - Absent.    left 0 - Absent.    Constancy of REST tremor: 2: Mild: Tremor at rest is present 26-50% of the entire examination period.    Postural tremor:    right 0 - Absent.    left 0 - Absent.    Kinetic tremor:    right 0 - Absent.    left 0 - Absent.    Dyskinesias present? No       Total Motor UPDRS:  29        V. Laboratory/ Radiological Data:     Lab Results   Component Value Date    CREATININE 1.9 (H) 04/08/2016        Lab Results   Component Value Date    [id number] 304 04/08/2016     B1 61  spep normal    VI. Assessment and Plan    Problem List Items Addressed This Visit        1 - High    PD (Parkinson's disease) - Primary    Overview     classic type, left-predominant tremor         Current Assessment & Plan     Worsening balance, no falls.   -> PT         Relevant Orders    Ambulatory Referral to Physical/Occupational Therapy       2     Mild cognitive impairment with memory loss    Overview     2/1/18 [place] Cognitive Assessment:   23/30         Current Assessment & Plan     [facility] by testing today, memory predominant.    -> start B12 supplements   -> consider aricept (he declines today)                 Follow-up in about 3 months (around 5/1/2018) for PD.

## 2018-02-01 NOTE — ASSESSMENT & PLAN NOTE
[facility] by testing today, memory predominant.    -> start B12 supplements   -> consider aricept (he declines today)

## 2018-02-26 ENCOUNTER — NURSE TRIAGE (OUTPATIENT)
Dept: ADMINISTRATIVE | Facility: CLINIC | Age: 68
End: 2018-02-26

## 2018-02-26 ENCOUNTER — OFFICE VISIT (OUTPATIENT)
Dept: CARDIOLOGY | Facility: CLINIC | Age: 68
End: 2018-02-26
Payer: MEDICARE

## 2018-02-26 ENCOUNTER — HOSPITAL ENCOUNTER (OUTPATIENT)
Dept: RADIOLOGY | Facility: HOSPITAL | Age: 68
Discharge: HOME | End: 2018-02-26
Attending: INTERNAL MEDICINE
Payer: MEDICARE

## 2018-02-26 VITALS
HEIGHT: 69 IN | HEART RATE: 72 BPM | DIASTOLIC BLOOD PRESSURE: 85 MMHG | WEIGHT: 153.44 LBS | BODY MASS INDEX: 22.73 KG/M2 | SYSTOLIC BLOOD PRESSURE: 145 MMHG

## 2018-02-26 DIAGNOSIS — Z91.89 AT RISK FOR CORONARY ARTERY DISEASE: ICD-10-CM

## 2018-02-26 DIAGNOSIS — I10 MALIGNANT HYPERTENSION: Primary | ICD-10-CM

## 2018-02-26 DIAGNOSIS — I44.4 LEFT ANTERIOR HEMIBLOCK: ICD-10-CM

## 2018-02-26 DIAGNOSIS — I34.0 NON-RHEUMATIC MITRAL REGURGITATION: ICD-10-CM

## 2018-02-26 DIAGNOSIS — N18.30 STAGE 3 CHRONIC KIDNEY DISEASE: ICD-10-CM

## 2018-02-26 PROCEDURE — 99214 OFFICE O/P EST MOD 30 MIN: CPT | Mod: PBBFAC,25,PO | Performed by: INTERNAL MEDICINE

## 2018-02-26 PROCEDURE — 75571 CT HRT W/O DYE W/CA TEST: CPT | Mod: TC,PO

## 2018-02-26 PROCEDURE — 75571 CT HRT W/O DYE W/CA TEST: CPT | Mod: 26,,, | Performed by: RADIOLOGY

## 2018-02-26 PROCEDURE — 1159F MED LIST DOCD IN RCRD: CPT | Mod: ,,, | Performed by: INTERNAL MEDICINE

## 2018-02-26 PROCEDURE — 99214 OFFICE O/P EST MOD 30 MIN: CPT | Mod: S$PBB,,, | Performed by: INTERNAL MEDICINE

## 2018-02-26 PROCEDURE — 99999 PR PBB SHADOW E&M-EST. PATIENT-LVL IV: CPT | Mod: PBBFAC,,, | Performed by: INTERNAL MEDICINE

## 2018-02-26 PROCEDURE — 1126F AMNT PAIN NOTED NONE PRSNT: CPT | Mod: ,,, | Performed by: INTERNAL MEDICINE

## 2018-02-26 RX ORDER — GLUCOSAM/CHONDRO/HERB 149/HYAL 750-100 MG
1 TABLET ORAL DAILY
COMMUNITY
End: 2018-09-26

## 2018-02-26 RX ORDER — HYDRALAZINE HYDROCHLORIDE 50 MG/1
50 TABLET, FILM COATED ORAL EVERY 12 HOURS
Qty: 60 TABLET | Refills: 5 | Status: SHIPPED | OUTPATIENT
Start: 2018-02-26 | End: 2018-03-13 | Stop reason: SDUPTHER

## 2018-02-26 RX ORDER — VITAMIN B COMPLEX
1 TABLET ORAL DAILY
COMMUNITY
End: 2018-07-05

## 2018-02-26 RX ORDER — AMLODIPINE BESYLATE 10 MG/1
5 TABLET ORAL 2 TIMES DAILY
COMMUNITY
End: 2018-07-05

## 2018-02-26 RX ORDER — HYDRALAZINE HYDROCHLORIDE 25 MG/1
TABLET, FILM COATED ORAL
Refills: 0 | COMMUNITY
Start: 2018-02-13 | End: 2018-02-26 | Stop reason: DRUGHIGH

## 2018-02-26 NOTE — TELEPHONE ENCOUNTER
Reason for Disposition   BP > 180/110    Protocols used: ST HIGH BLOOD PRESSURE-A-OH    Wife states that pt /111, asymptomatic. Wife would like apt with cards.  sent to triage due to high BP. Please call pt to schedule apt.

## 2018-02-26 NOTE — PROGRESS NOTES
Subjective:    Patient ID:  Yannick Dorsey is a 68 y.o. male who presents for Hypertension and Valvular Heart Disease        HPI  PT WAS FOLLOWED AT [facility], NEW TO THIS CLINIC, LAST OV 1/2017, REQUESTED OV, BP > 200/100, S/P ER AT [facility], WAS GIVEN IV HYDRALAZINE,MEDS CHANGED,  BP STILL HIGH, CR WAS 1.85,WORSENING CKD, FOLLOWED BY DR VARMA,  , SEE ROS    Past Medical History:   Diagnosis Date    Broken fibula 2/23/2015    left calf    Broken tibia 2/23/2015    left calve    Cancer     SKIN    Compulsive gambling 7/31/2012    COPD (chronic obstructive pulmonary disease)     Heart murmur     HTN (hypertension)     Hyperlipidemia     PD (Parkinson's disease) 2009    left tremor-dominant    Stroke 1998    Tic 1994    right shoulder     Past Surgical History:   Procedure Laterality Date    CARDIAC CATHETERIZATION       Family History   Problem Relation Age of Onset    Tics Brother      Social History     Social History    Marital status:      Spouse name: N/A    Number of children: N/A    Years of education: N/A     Social History Main Topics    Smoking status: Never Smoker    Smokeless tobacco: Never Used    Alcohol use No    Drug use: No    Sexual activity: Not Asked     Other Topics Concern    None     Social History Narrative    None       Review of patient's allergies indicates:   Allergen Reactions    Penicillins      Other reaction(s): Unknown    Pramipexole Other (See Comments)     Compulsive gambling at 0.5mg tid       Current Outpatient Prescriptions:     amantadine HCl (SYMMETREL) 50 mg/5 mL Soln, Take 10 mLs (100 mg total) by mouth once daily., Disp: 900 mL, Rfl: 4    amLODIPine (NORVASC) 10 MG tablet, Take 5 mg by mouth 2 (two) times daily., Disp: , Rfl:     aspirin (ECOTRIN) 81 MG EC tablet, Take 81 mg by mouth once daily., Disp: , Rfl:     benztropine (COGENTIN) 0.5 MG tablet, Take 1 tablet (0.5 mg total) by mouth 2 (two) times daily., Disp: 180 tablet, Rfl: 3     carbidopa-levodopa  mg (SINEMET CR)  mg TbSR, Take 1 tablet by mouth 4 (four) times daily., Disp: 360 tablet, Rfl: 3    cyanocobalamin (VITAMIN B-12) 100 MCG tablet, Take 1 tablet (100 mcg total) by mouth once daily., Disp: 30 tablet, Rfl: 11    cyanocobalamin, vitamin B-12, (VITAMIN B-12) 2,500 mcg Subl, Place 1 tablet under the tongue once daily., Disp: , Rfl:     omega 3-dha-epa-fish oil (FISH OIL) 1,000 mg (120 mg-180 mg) Cap, Take 1 capsule by mouth once daily., Disp: , Rfl:     PROAIR HFA 90 mcg/actuation inhaler, Inhale into the lungs 4 times daily as needed., Disp: , Rfl:     SYMBICORT 80-4.5 mcg/actuation HFAA, , Disp: , Rfl:     hydrALAZINE (APRESOLINE) 50 MG tablet, Take 1 tablet (50 mg total) by mouth every 12 (twelve) hours., Disp: 60 tablet, Rfl: 5    Review of Systems   Constitution: Negative for chills, diaphoresis, fever, weakness, malaise/fatigue, night sweats and weight gain.   HENT: Negative for congestion, hearing loss and nosebleeds.    Eyes: Negative for blurred vision, double vision and visual disturbance.   Cardiovascular: Negative for chest pain, claudication, cyanosis, dyspnea on exertion (MILD/ COPD), irregular heartbeat, leg swelling (OCC), near-syncope, orthopnea, palpitations, paroxysmal nocturnal dyspnea and syncope.   Respiratory: Negative for cough, hemoptysis, shortness of breath and wheezing (OCC).    Endocrine: Positive for cold intolerance. Negative for heat intolerance and polyuria.   Hematologic/Lymphatic: Negative for adenopathy and bleeding problem. Does not bruise/bleed easily.   Skin: Negative for color change, itching and nail changes.   Musculoskeletal: Negative for back pain, falls and joint pain.   Gastrointestinal: Negative for abdominal pain, change in bowel habit, dysphagia, heartburn, hematemesis, jaundice, melena and vomiting.   Genitourinary: Negative for dysuria, flank pain and frequency.   Neurological: Positive for tremors (PARKINSON'S).  "Negative for brief paralysis, dizziness (OCC), focal weakness, light-headedness and loss of balance.   Psychiatric/Behavioral: Negative for altered mental status, depression and memory loss. The patient is not nervous/anxious.    Allergic/Immunologic: Negative for hives and persistent infections.        Objective:      Vitals:    02/26/18 1448   BP: (!) 145/85   Pulse: 72   Weight: 69.6 kg (153 lb 7 oz)   Height: 5' 9" (1.753 m)   PainSc: 0-No pain     Body mass index is 22.66 kg/m².    Physical Exam   Constitutional: He is oriented to person, place, and time. He appears well-developed and well-nourished. He is active.   HENT:   Head: Normocephalic and atraumatic.   Mouth/Throat: Oropharynx is clear and moist and mucous membranes are normal.   Eyes: Conjunctivae and EOM are normal. Pupils are equal, round, and reactive to light.   Neck: Normal range of motion. Neck supple. Normal carotid pulses, no hepatojugular reflux and no JVD present. Carotid bruit is not present. No tracheal deviation, no edema and no erythema present. No thyromegaly present.   Cardiovascular: Normal rate and regular rhythm.   No extrasystoles are present. PMI is not displaced.  Exam reveals no gallop, no distant heart sounds, no friction rub and no midsystolic click.    Murmur heard.  High-pitched holosystolic murmur is present with a grade of 2/6  at the apex  Pulses:       Carotid pulses are 2+ on the right side, and 2+ on the left side.       Radial pulses are 2+ on the right side, and 2+ on the left side.        Femoral pulses are 2+ on the right side, and 2+ on the left side.       Dorsalis pedis pulses are 2+ on the right side, and 2+ on the left side.        Posterior tibial pulses are 2+ on the right side, and 2+ on the left side.   Pulmonary/Chest: Effort normal and breath sounds normal. No accessory muscle usage. No tachypnea and no bradypnea. No respiratory distress.   Abdominal: Soft. Bowel sounds are normal. He exhibits no " distension and no mass. There is no hepatosplenomegaly. There is no tenderness. There is no CVA tenderness.   Musculoskeletal: Normal range of motion. He exhibits no edema or deformity.   Lymphadenopathy:     He has no cervical adenopathy.   Neurological: He is alert and oriented to person, place, and time. He has normal strength. He displays tremor. No cranial nerve deficit. He exhibits normal muscle tone. Coordination normal.   SLIGHTLY SLOW GAIT   Skin: Skin is warm and dry. No cyanosis or erythema. No pallor.   Psychiatric: He has a normal mood and affect. His speech is normal and behavior is normal. Judgment and thought content normal.               ..    Chemistry        Component Value Date/Time     04/08/2016 1600    K 4.7 04/08/2016 1600     04/08/2016 1600    CO2 25 04/08/2016 1600    BUN 35 (H) 04/08/2016 1600    CREATININE 1.9 (H) 04/08/2016 1600     04/08/2016 1600        Component Value Date/Time    CALCIUM 9.0 04/08/2016 1600    ALKPHOS 109 04/08/2016 1600    AST 17 04/08/2016 1600    ALT <5 (L) 04/08/2016 1600    BILITOT 0.3 04/08/2016 1600    ESTGFRAFRICA 42.1 (A) 04/08/2016 1600    EGFRNONAA 36.4 (A) 04/08/2016 1600            ..No results found for: CHOL  No results found for: HDL  No results found for: LDLCALC  No results found for: TRIG  No results found for: CHOLHDL  ..  Lab Results   Component Value Date    WBC 5.43 04/08/2016    HGB 12.9 (L) 04/08/2016    HCT 39.6 (L) 04/08/2016    MCV 89 04/08/2016     04/08/2016       Test(s) Reviewed  I have reviewed the following in detail:  [] Stress test   [] Angiography   [] Echocardiogram   [x] Labs   [x] Other:       Assessment:         ICD-10-CM ICD-9-CM   1. Malignant hypertension I10 401.0   2. Non-rheumatic mitral regurgitation I34.0 424.0   3. Left anterior hemiblock I44.4 426.2   4. Stage 3 chronic kidney disease N18.3 585.3     Problem List Items Addressed This Visit        Cardiac/Vascular    Malignant hypertension -  Primary    Non-rheumatic mitral regurgitation    Left anterior hemiblock       Renal/    Stage 3 chronic kidney disease           Plan:     INCREASE HYDRALAZINE  BID FOR BP, AFTERLOAD REDUCTION, INSTRUCTIONS ON PRN EXTRA HYDRALAZINE, ALL OTHER CV CLINICALLY STABLE, NO ANGINA, NO HF, NO TIA, NO CLINICAL ARRHYTHMIA,CONTINUE CURRENT MEDS, EDUCATION, DIET, EXERCISE,CHECK FOR AAA ,CA SCORE,  RTC IN 6 MO      Malignant hypertension    Non-rheumatic mitral regurgitation    Left anterior hemiblock    Stage 3 chronic kidney disease    Other orders  -     hydrALAZINE (APRESOLINE) 50 MG tablet; Take 1 tablet (50 mg total) by mouth every 12 (twelve) hours.  Dispense: 60 tablet; Refill: 5    RTC Low level/low impact aerobic exercise 5x's/wk. Heart healthy diet and risk factor modification.    See labs and med orders.    Aerobic exercise 5x's/wk. Heart healthy diet and risk factor modification.    See labs and med orders.

## 2018-03-13 DIAGNOSIS — I10 HYPERTENSION, UNSPECIFIED TYPE: Primary | ICD-10-CM

## 2018-03-13 RX ORDER — HYDRALAZINE HYDROCHLORIDE 50 MG/1
50 TABLET, FILM COATED ORAL EVERY 12 HOURS
Qty: 60 TABLET | Refills: 5 | Status: ON HOLD | OUTPATIENT
Start: 2018-03-13 | End: 2018-07-27

## 2018-03-20 ENCOUNTER — CLINICAL SUPPORT (OUTPATIENT)
Dept: CARDIOLOGY | Facility: CLINIC | Age: 68
End: 2018-03-20
Attending: INTERNAL MEDICINE
Payer: MEDICARE

## 2018-03-20 DIAGNOSIS — I10 MALIGNANT HYPERTENSION: ICD-10-CM

## 2018-03-20 LAB — VASCULAR ABDOMINAL AORTIC ANEURYSM (AAA): 2.18

## 2018-03-20 PROCEDURE — 93978 VASCULAR STUDY: CPT | Mod: PBBFAC,PO | Performed by: INTERNAL MEDICINE

## 2018-03-21 ENCOUNTER — TELEPHONE (OUTPATIENT)
Dept: CARDIOLOGY | Facility: CLINIC | Age: 68
End: 2018-03-21

## 2018-03-22 ENCOUNTER — TELEPHONE (OUTPATIENT)
Dept: CARDIOLOGY | Facility: CLINIC | Age: 68
End: 2018-03-22

## 2018-03-22 DIAGNOSIS — R06.00 DYSPNEA, UNSPECIFIED TYPE: Primary | ICD-10-CM

## 2018-03-22 DIAGNOSIS — R06.02 SHORTNESS OF BREATH: ICD-10-CM

## 2018-03-22 NOTE — TELEPHONE ENCOUNTER
----- Message from Jose Martin Jama MD sent at 3/21/2018  4:58 PM CDT -----  Ca score > 1000, needs nuclear stress also dx sob

## 2018-03-22 NOTE — TELEPHONE ENCOUNTER
----- Message from Jose Martin Jama MD sent at 3/21/2018  5:00 PM CDT -----  CA SCORE > 1000, NEEDS NUCLEAR STRESS ALSO DX SOB

## 2018-04-02 ENCOUNTER — TELEPHONE (OUTPATIENT)
Dept: CARDIOLOGY | Facility: CLINIC | Age: 68
End: 2018-04-02

## 2018-04-02 NOTE — TELEPHONE ENCOUNTER
Tomasa RN in the stress lab spoke with the patient and his wife earlier today explaining to them there is no dye involved in this test. They verbalized understanding and will keep their appointment for tomorrow.

## 2018-04-02 NOTE — TELEPHONE ENCOUNTER
----- Message from Elva Banks sent at 4/2/2018 11:54 AM CDT -----  Contact: Wife  Marissa, wife [phone], [phone], Calling about patients scheduled nuclear medicine testing, Wanted to make sure the office is aware of the patient being in Stage 3 Kidney failure. Please advise if OK to have dye with this testing. Thanks.

## 2018-04-03 ENCOUNTER — HOSPITAL ENCOUNTER (OUTPATIENT)
Dept: RADIOLOGY | Facility: HOSPITAL | Age: 68
Discharge: HOME | End: 2018-04-03
Attending: INTERNAL MEDICINE
Payer: MEDICARE

## 2018-04-03 ENCOUNTER — CLINICAL SUPPORT (OUTPATIENT)
Dept: CARDIOLOGY | Facility: CLINIC | Age: 68
End: 2018-04-03
Attending: INTERNAL MEDICINE
Payer: MEDICARE

## 2018-04-03 DIAGNOSIS — R06.02 SHORTNESS OF BREATH: ICD-10-CM

## 2018-04-03 DIAGNOSIS — R06.00 DYSPNEA, UNSPECIFIED TYPE: ICD-10-CM

## 2018-04-03 PROCEDURE — 93018 CV STRESS TEST I&R ONLY: CPT | Mod: S$PBB,,, | Performed by: INTERNAL MEDICINE

## 2018-04-03 PROCEDURE — 78452 HT MUSCLE IMAGE SPECT MULT: CPT | Mod: 26,,, | Performed by: INTERNAL MEDICINE

## 2018-04-03 PROCEDURE — 93017 CV STRESS TEST TRACING ONLY: CPT | Mod: PBBFAC,PO | Performed by: INTERNAL MEDICINE

## 2018-04-03 PROCEDURE — 93016 CV STRESS TEST SUPVJ ONLY: CPT | Mod: S$PBB,,, | Performed by: INTERNAL MEDICINE

## 2018-04-05 ENCOUNTER — TELEPHONE (OUTPATIENT)
Dept: CARDIOLOGY | Facility: CLINIC | Age: 68
End: 2018-04-05

## 2018-04-05 LAB — DIASTOLIC DYSFUNCTION: NO

## 2018-04-06 NOTE — TELEPHONE ENCOUNTER
----- Message from Heidi Benitez sent at 4/6/2018 12:58 PM CDT -----  Contact: wife  Please send cholesterol medication to [facility]. (pravastatin is $0 for 90 days)  Call back number [phone]    [facility] - [place], [place] - [address] [place] [address]  [address]  [facility] [address]  Phone: [phone] Fax: [phone]

## 2018-04-09 RX ORDER — PRAVASTATIN SODIUM 40 MG/1
40 TABLET ORAL DAILY
Qty: 90 TABLET | Refills: 1 | Status: SHIPPED | OUTPATIENT
Start: 2018-04-09 | End: 2018-08-29 | Stop reason: SDUPTHER

## 2018-05-03 ENCOUNTER — TELEPHONE (OUTPATIENT)
Dept: NEUROLOGY | Facility: CLINIC | Age: 68
End: 2018-05-03

## 2018-05-03 NOTE — LETTER
[facility]  [address]  [facility] [address]  Phone: [phone]  Fax: [phone] May 3, 2018    Yannick Dorsey  [address] [place] [id number]      To Whom It May Concern:    Yannick Dorsey is unable to participate in jury duty due to Parkinson's Disease.    If you have any questions or concerns, please feel free to call my office.    Sincerely,          Cassia Paredes MD

## 2018-05-03 NOTE — TELEPHONE ENCOUNTER
----- Message from Jalyn Dominguez sent at 5/3/2018  9:58 AM CDT -----  Patient Requesting Excuse from Jury duty    Who Called: Marissa (wife)  Statement needed to excuse the patient from jury duty  Communication Preference (Rohan response to Pt. (or) Call Back # and timeframe):[phone]  Additional Information:says if it is sent to the [place] office she can pick it up.

## 2018-06-01 PROBLEM — K59.00 CONSTIPATED: Status: ACTIVE | Noted: 2018-06-01

## 2018-06-28 DIAGNOSIS — G20.A1 PD (PARKINSON'S DISEASE): ICD-10-CM

## 2018-06-28 RX ORDER — CARBIDOPA AND LEVODOPA 50; 200 MG/1; MG/1
TABLET, EXTENDED RELEASE ORAL
Qty: 360 TABLET | Refills: 4 | Status: SHIPPED | OUTPATIENT
Start: 2018-06-28 | End: 2018-07-16 | Stop reason: CLARIF

## 2018-07-24 PROBLEM — D12.0 ADENOMA OF CECUM: Status: ACTIVE | Noted: 2018-07-24

## 2018-07-28 RX ORDER — HYDRALAZINE HYDROCHLORIDE 50 MG/1
TABLET, FILM COATED ORAL
Qty: 180 TABLET | Refills: 0 | Status: SHIPPED | OUTPATIENT
Start: 2018-07-28 | End: 2018-08-02 | Stop reason: SDUPTHER

## 2018-08-27 ENCOUNTER — LAB VISIT (OUTPATIENT)
Dept: LAB | Facility: HOSPITAL | Age: 68
End: 2018-08-27
Attending: INTERNAL MEDICINE
Payer: MEDICARE

## 2018-08-27 ENCOUNTER — OFFICE VISIT (OUTPATIENT)
Dept: CARDIOLOGY | Facility: CLINIC | Age: 68
End: 2018-08-27
Payer: MEDICARE

## 2018-08-27 VITALS
HEART RATE: 63 BPM | BODY MASS INDEX: 21.22 KG/M2 | DIASTOLIC BLOOD PRESSURE: 78 MMHG | SYSTOLIC BLOOD PRESSURE: 132 MMHG | WEIGHT: 143.31 LBS | HEIGHT: 69 IN

## 2018-08-27 DIAGNOSIS — E78.00 HYPERCHOLESTEROLEMIA: ICD-10-CM

## 2018-08-27 DIAGNOSIS — D50.8 OTHER IRON DEFICIENCY ANEMIA: ICD-10-CM

## 2018-08-27 DIAGNOSIS — N18.30 STAGE 3 CHRONIC KIDNEY DISEASE: ICD-10-CM

## 2018-08-27 DIAGNOSIS — I10 ESSENTIAL HYPERTENSION: ICD-10-CM

## 2018-08-27 DIAGNOSIS — R06.02 SOB (SHORTNESS OF BREATH): ICD-10-CM

## 2018-08-27 DIAGNOSIS — R94.39 ABNORMAL NUCLEAR STRESS TEST: ICD-10-CM

## 2018-08-27 DIAGNOSIS — R93.1 AGATSTON CAC SCORE, >400: Primary | ICD-10-CM

## 2018-08-27 PROBLEM — D50.9 IRON DEFICIENCY ANEMIA: Status: ACTIVE | Noted: 2018-08-27

## 2018-08-27 LAB — HGB BLD-MCNC: 11.5 G/DL

## 2018-08-27 PROCEDURE — 99214 OFFICE O/P EST MOD 30 MIN: CPT | Mod: S$PBB,,, | Performed by: INTERNAL MEDICINE

## 2018-08-27 PROCEDURE — 85018 HEMOGLOBIN: CPT

## 2018-08-27 PROCEDURE — 99999 PR PBB SHADOW E&M-EST. PATIENT-LVL IV: CPT | Mod: PBBFAC,,, | Performed by: INTERNAL MEDICINE

## 2018-08-27 PROCEDURE — 99214 OFFICE O/P EST MOD 30 MIN: CPT | Mod: PBBFAC,PO | Performed by: INTERNAL MEDICINE

## 2018-08-27 PROCEDURE — 36415 COLL VENOUS BLD VENIPUNCTURE: CPT | Mod: PO

## 2018-08-27 RX ORDER — NITROGLYCERIN 0.4 MG/1
0.4 TABLET SUBLINGUAL EVERY 5 MIN PRN
Qty: 25 TABLET | Refills: 0 | Status: SHIPPED | OUTPATIENT
Start: 2018-08-27 | End: 2023-01-04

## 2018-08-27 RX ORDER — SODIUM CHLORIDE 9 MG/ML
INJECTION, SOLUTION INTRAVENOUS ONCE
Status: CANCELLED | OUTPATIENT
Start: 2018-08-27 | End: 2018-08-27

## 2018-08-27 RX ORDER — SODIUM CHLORIDE 0.9 % (FLUSH) 0.9 %
5 SYRINGE (ML) INJECTION
Status: DISCONTINUED | OUTPATIENT
Start: 2018-08-27 | End: 2018-08-27

## 2018-08-27 RX ORDER — MEGESTROL ACETATE 40 MG/ML
400 SUSPENSION ORAL DAILY
COMMUNITY
Start: 2018-08-15 | End: 2019-03-06

## 2018-08-27 RX ORDER — CLONIDINE HYDROCHLORIDE 0.1 MG/1
0.1 TABLET ORAL DAILY PRN
COMMUNITY
End: 2024-01-17

## 2018-08-27 NOTE — PROGRESS NOTES
Subjective:    Patient ID:  Yannick Dorsey is a 68 y.o. male who presents for Hypertension and Coronary Artery Disease        HPI  RECENT LABS NOTED, CR 1.83, HB 9.1 WHEN HAD PARTIAL COLECTOMY FOR POLYP, NO CANCER, BP LOG OK,LFT'S OK IN April, CA SCORE 1054, LOW RISK STRESS TEST,  STRESS TEST,  SEE ROS    Past Medical History:   Diagnosis Date    Broken fibula 2/23/2015    left calf    Broken tibia 2/23/2015    left calve    Cancer     SKIN    Compulsive gambling 7/31/2012    COPD (chronic obstructive pulmonary disease)     Heart murmur     HTN (hypertension)     Hyperlipidemia     Kidney disease, chronic, stage III (GFR 30-59 ml/min)     PD (Parkinson's disease) 2009    left tremor-dominant    Stroke 1998    Tic 1994    right shoulder     Past Surgical History:   Procedure Laterality Date    CARDIAC CATHETERIZATION      JOINT REPLACEMENT Bilateral     LEG SURGERY Left     PARATHYROIDECTOMY      PROSTATE SURGERY       Family History   Problem Relation Age of Onset    Tics Brother     Cancer Brother     Cancer Mother     COPD Mother     Stroke Father      Social History     Socioeconomic History    Marital status:      Spouse name: None    Number of children: None    Years of education: None    Highest education level: None   Social Needs    Financial resource strain: None    Food insecurity - worry: None    Food insecurity - inability: None    Transportation needs - medical: None    Transportation needs - non-medical: None   Occupational History    None   Tobacco Use    Smoking status: Never Smoker    Smokeless tobacco: Never Used   Substance and Sexual Activity    Alcohol use: No    Drug use: No    Sexual activity: None   Other Topics Concern    None   Social History Narrative    None       Review of patient's allergies indicates:   Allergen Reactions    Penicillins      Other reaction(s): Unknown    Pramipexole Other (See Comments)     Compulsive gambling at 0.5mg  tid       Current Outpatient Medications:     albuterol (PROVENTIL) 5 mg/mL nebulizer solution, Take 2.5 mg by nebulization every 6 (six) hours as needed for Wheezing. Rescue, Disp: , Rfl:     amantadine HCl (SYMMETREL) 50 mg/5 mL Soln, Take 10 mLs (100 mg total) by mouth once daily., Disp: 900 mL, Rfl: 4    aspirin (ECOTRIN) 81 MG EC tablet, Take 81 mg by mouth every evening. , Disp: , Rfl:     benztropine (COGENTIN) 0.5 MG tablet, Take 1 tablet (0.5 mg total) by mouth 2 (two) times daily., Disp: 180 tablet, Rfl: 3    carbidopa-levodopa  mg (SINEMET CR)  mg TbSR, Take 1 tablet by mouth 2 (two) times daily., Disp: , Rfl:     cloNIDine (CATAPRES) 0.1 MG tablet, Take 0.1 mg by mouth once daily., Disp: , Rfl:     cyanocobalamin (VITAMIN B-12) 100 MCG tablet, Take 1 tablet (100 mcg total) by mouth once daily., Disp: 30 tablet, Rfl: 11    hydrALAZINE (APRESOLINE) 50 MG tablet, Take 1 tablet (50 mg total) by mouth 3 (three) times daily. (Patient taking differently: Take 25 mg by mouth every 12 (twelve) hours. ), Disp: 60 tablet, Rfl: 5    megestrol (MEGACE) 400 mg/10 mL (40 mg/mL) Susp, , Disp: , Rfl:     PROAIR HFA 90 mcg/actuation inhaler, Inhale 2 puffs into the lungs 4 times daily as needed. , Disp: , Rfl:     SYMBICORT 80-4.5 mcg/actuation HFAA, Inhale 2 puffs into the lungs 2 (two) times daily. , Disp: , Rfl:     vitamin D 1000 units Tab, Take 1,000 Units by mouth once daily., Disp: , Rfl:     nitroGLYCERIN (NITROSTAT) 0.4 MG SL tablet, Place 1 tablet (0.4 mg total) under the tongue every 5 (five) minutes as needed., Disp: 25 tablet, Rfl: 0    omega 3-dha-epa-fish oil (FISH OIL) 1,000 mg (120 mg-180 mg) Cap, Take 1 capsule by mouth once daily., Disp: , Rfl:     pravastatin (PRAVACHOL) 40 MG tablet, TAKE 1 TABLET BY MOUTH ONCE DAILY, Disp: 90 tablet, Rfl: 1    Current Facility-Administered Medications:     ceFOXItin 2 g/50ml Dextrose IVPB, 2 g, Intravenous, On Call Procedure, Farshad REAVES  "MD Sanket    Review of Systems   Constitution: Positive for weight loss. Negative for chills, diaphoresis, fever, weakness, malaise/fatigue and night sweats.   HENT: Negative for congestion, hearing loss and nosebleeds.    Eyes: Negative for blurred vision, double vision and visual disturbance.   Cardiovascular: Positive for dyspnea on exertion (MILD/ MOD, ALSO COPD). Negative for chest pain, claudication, cyanosis, irregular heartbeat, leg swelling (OCC), near-syncope, orthopnea, palpitations, paroxysmal nocturnal dyspnea and syncope.   Respiratory: Positive for shortness of breath (SOME). Negative for cough (CHRONIC), hemoptysis and wheezing (OCC).    Endocrine: Negative for cold intolerance, heat intolerance and polyuria.   Hematologic/Lymphatic: Negative for adenopathy and bleeding problem. Does not bruise/bleed easily.   Skin: Negative for color change, itching and nail changes.   Musculoskeletal: Negative for back pain, falls and joint pain.   Gastrointestinal: Negative for abdominal pain, change in bowel habit, dysphagia, heartburn, hematemesis, jaundice, melena and vomiting.   Genitourinary: Negative for dysuria, flank pain and frequency.   Neurological: Negative for brief paralysis, dizziness (OCC), focal weakness, light-headedness, loss of balance and tremors (PARKINSON'S).   Psychiatric/Behavioral: Negative for altered mental status, depression and memory loss.   Allergic/Immunologic: Negative for hives and persistent infections.        Objective:      Vitals:    08/27/18 0854   BP: 132/78   Pulse: 63   Weight: 65 kg (143 lb 4.8 oz)   Height: 5' 9" (1.753 m)   PainSc: 0-No pain     Body mass index is 21.16 kg/m².    Physical Exam   Constitutional: He is oriented to person, place, and time. He appears well-developed and well-nourished. He is active.   HENT:   Head: Normocephalic and atraumatic.   Mouth/Throat: Oropharynx is clear and moist and mucous membranes are normal.   Eyes: Conjunctivae and EOM are " normal. Pupils are equal, round, and reactive to light.   Neck: Normal range of motion. Neck supple. Normal carotid pulses, no hepatojugular reflux and no JVD present. Carotid bruit is not present. No tracheal deviation, no edema and no erythema present. No thyromegaly present.   Cardiovascular: Normal rate and regular rhythm.  No extrasystoles are present. PMI is not displaced. Exam reveals no gallop, no distant heart sounds, no friction rub and no midsystolic click.   Murmur heard.  High-pitched holosystolic murmur is present with a grade of 2/6 at the apex.  Pulses:       Carotid pulses are 2+ on the right side, and 2+ on the left side.       Radial pulses are 2+ on the right side, and 2+ on the left side.        Femoral pulses are 2+ on the right side, and 2+ on the left side.       Dorsalis pedis pulses are 2+ on the right side, and 2+ on the left side.        Posterior tibial pulses are 2+ on the right side, and 2+ on the left side.   Pulmonary/Chest: Effort normal and breath sounds normal. No accessory muscle usage. No tachypnea and no bradypnea. No respiratory distress.   Abdominal: Soft. Bowel sounds are normal. He exhibits no distension and no mass. There is no hepatosplenomegaly. There is no tenderness. There is no CVA tenderness.   Musculoskeletal: Normal range of motion. He exhibits no edema or deformity.   Lymphadenopathy:     He has no cervical adenopathy.   Neurological: He is alert and oriented to person, place, and time. He has normal strength. He displays tremor. No cranial nerve deficit.   SLIGHTLY SLOW GAIT   Skin: Skin is warm and dry. No cyanosis or erythema. There is pallor.   Psychiatric: He has a normal mood and affect. His speech is normal and behavior is normal.               ..    Chemistry        Component Value Date/Time     07/27/2018 0606    K 5.0 07/27/2018 0606     07/27/2018 0606    CO2 32 (H) 07/27/2018 0606    BUN 25 (H) 07/27/2018 0606    CREATININE 1.83 (H)  07/27/2018 0606     07/27/2018 0606        Component Value Date/Time    CALCIUM 9.1 07/27/2018 0606    ALKPHOS 109 04/08/2016 1600    AST 17 04/08/2016 1600    ALT <5 (L) 04/08/2016 1600    BILITOT 0.3 04/08/2016 1600    ESTGFRAFRICA 43 (A) 07/27/2018 0606    EGFRNONAA 38 (A) 07/27/2018 0606            ..No results found for: CHOL  No results found for: HDL  No results found for: LDLCALC  No results found for: TRIG  No results found for: CHOLHDL  ..  Lab Results   Component Value Date    WBC 5.94 07/27/2018    HGB 11.5 (L) 08/27/2018    HCT 27.8 (L) 07/27/2018    MCV 94 07/27/2018     07/27/2018       Test(s) Reviewed  I have reviewed the following in detail:  [] Stress test   [] Angiography   [] Echocardiogram   [] Labs   [] Other:       Assessment:         ICD-10-CM ICD-9-CM   1. Agatston CAC score, >400 R93.1 793.2   2. Abnormal nuclear stress test R94.39 794.39   3. SOB (shortness of breath) R06.02 786.05   4. Stage 3 chronic kidney disease N18.3 585.3   5. Hypercholesterolemia E78.00 272.0   6. Essential hypertension I10 401.9   7. Other iron deficiency anemia D50.8 280.8     Problem List Items Addressed This Visit        Cardiac/Vascular    Agatston CAC score, >400 - Primary    Relevant Orders    Case Request-Cath Lab: Left heart cath (Completed)    Abnormal nuclear stress test    Relevant Orders    Case Request-Cath Lab: Left heart cath (Completed)    Hypercholesterolemia    Essential hypertension       Renal/    Stage 3 chronic kidney disease       Oncology    Iron deficiency anemia    Relevant Orders    Hemoglobin (Completed)       Other    SOB (shortness of breath)    Relevant Orders    Case Request-Cath Lab: Left heart cath (Completed)           Plan:     DISCUSSED OPTIONS, IN VIEW OF SX AND FINDING, [facility], STAGE PROCEDURE IF INTERVENTION B/O CKD, ALSO RE-CHECK HB, WAS MARKEDLY ANEMIC, ALL OTHER  CV CLINICALLY STABLE, NO HF, NO TIA, NO CLINICAL ARRHYTHMIA,CONTINUE CURRENT MEDS, EDUCATION,  DIET, EXERCISE, DISCUSSED WITH PT/  WIFE, CONSENT      Agatston CAC score, >400  -     Case Request-Cath Lab: Left heart cath; Standing  -     Establish IV access and maintain saline lock; Standing  -     Hold Warfarin; Standing  -     Hold Enoxaparin/subcutaneous Heparin; Standing  -     Hold Heparin drip; Standing  -     Clip and Prep Right Radial; Standing  -     Diet NPO; Standing  -     CBC auto differential; Standing  -     EKG 12-lead; Standing    Abnormal nuclear stress test  -     Case Request-Cath Lab: Left heart cath; Standing  -     Establish IV access and maintain saline lock; Standing  -     Hold Warfarin; Standing  -     Hold Enoxaparin/subcutaneous Heparin; Standing  -     Hold Heparin drip; Standing  -     Clip and Prep Right Radial; Standing  -     Diet NPO; Standing  -     CBC auto differential; Standing  -     EKG 12-lead; Standing    SOB (shortness of breath)  -     Case Request-Cath Lab: Left heart cath; Standing  -     Establish IV access and maintain saline lock; Standing  -     Hold Warfarin; Standing  -     Hold Enoxaparin/subcutaneous Heparin; Standing  -     Hold Heparin drip; Standing  -     Clip and Prep Right Radial; Standing  -     Diet NPO; Standing  -     CBC auto differential; Standing  -     EKG 12-lead; Standing    Stage 3 chronic kidney disease    Hypercholesterolemia    Essential hypertension    Other iron deficiency anemia  -     Hemoglobin; Future; Expected date: 08/27/2018    Other orders  -     Discontinue: sodium chloride 0.9% flush 5 mL; Inject 5 mLs into the vein as needed for Line Care.  -     0.9%  NaCl infusion; Inject into the vein once.  -     nitroGLYCERIN (NITROSTAT) 0.4 MG SL tablet; Place 1 tablet (0.4 mg total) under the tongue every 5 (five) minutes as needed.  Dispense: 25 tablet; Refill: 0    RTC Low level/low impact aerobic exercise 5x's/wk. Heart healthy diet and risk factor modification.    See labs and med orders.    Aerobic exercise 5x's/wk. Heart  healthy diet and risk factor modification.    See labs and med orders.

## 2018-08-27 NOTE — PATIENT INSTRUCTIONS
Angiogram    Arrive for procedure at: [facility] on Thursday 9/27/18 for your 7am procedure.      You will receive a phone call from [facility] Pre-Op Department with further instructions prior to your scheduled procedure.    Notify the nurse if you are ALLERGIC TO IODINE.    FASTING: You MAY NOT have anything to eat or drink AFTER MIDNIGHT the day before your procedure. If your procedure is scheduled in the afternoon, you may have a LIGHT BREAKFAST 6-8 hours prior to your procedure.  For example: Two slices of toast; black coffee or black tea.    MEDICATIONS: You may take your regular morning medications with water. If there are any medications that you should not take, you will be instructed to hold them for that morning.    ? CARDIOLOGY PRE-PROCEDURE MEDICATION ORDERS:  ** Please hold any medications that are checked below:    HOLD   # OF DAYS TO HOLD  ? Coumadin   Consult with Coumadin Clinic   ? Xarelto    _DAY BEFORE & DAY OF_  ? Pradaxa  _ DAY BEFORE & DAY OF _  ? Eliquis   _ DAY BEFORE & DAY OF _  ? Metformin    Day before procedure & morning of procedure  ? Short acting insulin   Morning of procedure    CONTINUE the Following Medications   ? Plavix      ? Effient     ? Aspirin    WHAT TO EXPECT:    How long will the procedure take?  The procedure will take an average of 1 - 2 hours to perform.  After the procedure, you will need to lay flat for around 4 - 6 hours to minimize bleeding from the puncture site. If the wrist is accessed you will need to keep your arm still as instructed by the nurse.    When can I go home?  You may be able to be discharged home that same afternoon if there were no complications.  If you have one of the following: balloon; stent; pacemaker or defibrillator procedures, you may spend one night for observation.  Your doctor will determine your discharge based upon your progress.  The results of your procedure will be discussed with you before you are discharged.  Any  further testing or procedures will be scheduled for you either before you leave or you will be instructed to call for a future appointment.      TRANSPORTATION:  PLEASE ARRANGE TO HAVE SOMEONE DRIVE YOU HOME FOLLOWING YOUR PROCEDURE, YOU WILL NOT BE ALLOWED TO DRIVE.

## 2018-08-29 RX ORDER — PRAVASTATIN SODIUM 40 MG/1
TABLET ORAL
Qty: 90 TABLET | Refills: 1 | Status: SHIPPED | OUTPATIENT
Start: 2018-08-29 | End: 2019-03-18 | Stop reason: SDUPTHER

## 2018-10-01 ENCOUNTER — TELEPHONE (OUTPATIENT)
Dept: CARDIOLOGY | Facility: CLINIC | Age: 68
End: 2018-10-01

## 2018-10-01 NOTE — TELEPHONE ENCOUNTER
----- Message from Jermaine Mares sent at 10/1/2018 10:30 AM CDT -----  Type: Needs Medical Advice    Who Called:  Wife/Marissa Phillips Call Back Number: [phone]  Additional Information: Wife calling.  States that the patient has surgery on 10/04 and would like Dr. Jama to consult with the patient's nephrologist beforehand.  Dr. Francisco Samayoa

## 2018-10-06 ENCOUNTER — TELEPHONE (OUTPATIENT)
Dept: CARDIOLOGY | Facility: CLINIC | Age: 68
End: 2018-10-06

## 2018-10-08 ENCOUNTER — TELEPHONE (OUTPATIENT)
Dept: CARDIOLOGY | Facility: CLINIC | Age: 68
End: 2018-10-08

## 2018-10-08 ENCOUNTER — NURSE TRIAGE (OUTPATIENT)
Dept: ADMINISTRATIVE | Facility: CLINIC | Age: 68
End: 2018-10-08

## 2018-10-08 NOTE — TELEPHONE ENCOUNTER
Please advise: patient started on plavix day before admit, started getting very itchy/splotchy after being DC from hospital. Stated still having itchiness & thinking it could be from the plavix.

## 2018-10-08 NOTE — TELEPHONE ENCOUNTER
Reason for Disposition   Caller has already spoken with another triager and has no further questions.    Protocols used: ST NO CONTACT OR DUPLICATE CONTACT CALL-A-    Provider's office called during triage call, no triage required.

## 2018-10-09 NOTE — TELEPHONE ENCOUNTER
----- Message from Mya Frederick sent at 10/9/2018  8:59 AM CDT -----  Type: Needs Medical Advice    Who Called: wife- Marissa Dorsey  Symptoms (please be specific): rash all over body  How long has patient had these symptoms:  ongoing  Pharmacy name and phone #:    [facility] [address] [place] [address] - [address] [facility] [address]  [address]  [facility] [address]  Phone: [phone] Fax: [phone]  Best Call Back Number:[phone] (home)     Additional Information: Na

## 2018-10-14 DIAGNOSIS — G20.A1 PD (PARKINSON'S DISEASE): ICD-10-CM

## 2018-10-15 RX ORDER — AMANTADINE HYDROCHLORIDE 50 MG/5ML
SOLUTION ORAL
Qty: 900 ML | Refills: 4 | Status: SHIPPED | OUTPATIENT
Start: 2018-10-15 | End: 2019-09-19 | Stop reason: SDUPTHER

## 2018-10-22 NOTE — TELEPHONE ENCOUNTER
----- Message from Chelsi Fajardo sent at 10/22/2018  1:36 PM CDT -----  Type:  RX Refill Request    Who Called:  Julio C/pedro  Refill or New Rx:  new  RX Name and Strength:  Brilinta     Preferred Pharmacy with phone number:  [facility]  [facility] - [address]  [address]  [facility] [address]  Phone: [phone] Fax: [phone]  Local or Mail Order:  Mail order  Best Call Back Number:  [phone]

## 2018-10-26 ENCOUNTER — OFFICE VISIT (OUTPATIENT)
Dept: CARDIOLOGY | Facility: CLINIC | Age: 68
End: 2018-10-26
Payer: MEDICARE

## 2018-10-26 VITALS
DIASTOLIC BLOOD PRESSURE: 84 MMHG | SYSTOLIC BLOOD PRESSURE: 138 MMHG | WEIGHT: 140.19 LBS | BODY MASS INDEX: 20.76 KG/M2 | HEIGHT: 69 IN | HEART RATE: 76 BPM

## 2018-10-26 DIAGNOSIS — I25.118 CORONARY ARTERY DISEASE OF NATIVE ARTERY OF NATIVE HEART WITH STABLE ANGINA PECTORIS: Primary | ICD-10-CM

## 2018-10-26 DIAGNOSIS — Z79.02 LONG TERM (CURRENT) USE OF ANTITHROMBOTICS/ANTIPLATELETS: ICD-10-CM

## 2018-10-26 DIAGNOSIS — E78.00 HYPERCHOLESTEROLEMIA: ICD-10-CM

## 2018-10-26 DIAGNOSIS — Z95.5 STENTED CORONARY ARTERY: ICD-10-CM

## 2018-10-26 DIAGNOSIS — T50.905A DRUG SIDE EFFECTS, INITIAL ENCOUNTER: ICD-10-CM

## 2018-10-26 DIAGNOSIS — I10 ESSENTIAL HYPERTENSION: ICD-10-CM

## 2018-10-26 PROCEDURE — 99999 PR PBB SHADOW E&M-EST. PATIENT-LVL III: CPT | Mod: PBBFAC,,, | Performed by: INTERNAL MEDICINE

## 2018-10-26 PROCEDURE — 99213 OFFICE O/P EST LOW 20 MIN: CPT | Mod: PBBFAC,PO | Performed by: INTERNAL MEDICINE

## 2018-10-26 PROCEDURE — 99214 OFFICE O/P EST MOD 30 MIN: CPT | Mod: S$PBB,,, | Performed by: INTERNAL MEDICINE

## 2018-10-26 RX ORDER — ASPIRIN 81 MG/1
81 TABLET ORAL DAILY
COMMUNITY

## 2018-10-26 RX ORDER — FERROUS SULFATE 324(65)MG
325 TABLET, DELAYED RELEASE (ENTERIC COATED) ORAL DAILY
COMMUNITY

## 2018-10-26 NOTE — PROGRESS NOTES
Subjective:    Patient ID:  Yannick Dorsey is a 68 y.o. male who presents for Follow-up ([facility]); Coronary Artery Disease; and Hypertension        HPI    S/P PCI/NEGIN OF LAD, DID WELL, RASH WITH PLAVIX, SWITCH TO BRILINTA, BP FLUCTUATES, BREATHING BETTER, SEE ROS  Past Medical History:   Diagnosis Date    Broken fibula 2/23/2015    left calf    Broken tibia 2/23/2015    left calve    Cancer     SKIN    Compulsive gambling 7/31/2012    COPD (chronic obstructive pulmonary disease)     Coronary artery disease     Heart murmur     HTN (hypertension)     Hyperlipidemia     Kidney disease, chronic, stage III (GFR 30-59 ml/min)     PD (Parkinson's disease) 2009    left tremor-dominant    Polyp of colon     Stroke 1998    Tic 1994    right shoulder     Past Surgical History:   Procedure Laterality Date    Angiogram Renal Bilateral Selective N/A 9/27/2018    Performed by Jose Martin [facility] at [facility] CATH    Angiogram, Abdomen N/A 9/27/2018    Performed by [facility] at [facility] CATH    ANGIOGRAPHY OF ABDOMEN N/A 9/27/2018    Procedure: Angiogram, Abdomen;  Surgeon: Jose Martin Jama MD;  Location: [facility] CATH;  Service: Cardiology;  Laterality: N/A;    CARDIAC CATHETERIZATION      COLECTOMY Right 7/24/2018    Procedure: COLECTOMY;  Surgeon: Farshad Coles MD;  Location: [facility] OR;  Service: Colon and Rectal;  Laterality: Right;    COLECTOMY Right 7/24/2018    Performed by [facility] at [facility] OR    COLONOSCOPY N/A 6/1/2018    Procedure: COLONOSCOPY;  Surgeon: Ross Leyva MD;  Location: [facility] ENDO;  Service: Endoscopy;  Laterality: N/A;    COLONOSCOPY N/A 6/1/2018    Performed by Ross Le[facility]va MD at [facility] ENDO    CORONARY STENT PLACEMENT N/A 10/4/2018    Procedure: INSERTION, STENT, CORONARY ARTERY;  Surgeon: Jose Martin Jama MD;  Location: [facility] CATH;  Service: Cardiology;  Laterality: N/A;    INSERTION, STENT, CORONARY ARTERY N/A 10/4/2018    Performed by [facility] at [facility] CATH     JOINT REPLACEMENT Bilateral     knees    Left heart cath Left 10/4/2018    [facility] CATH    Left heart cath N/A 9/27/2018    Performed by [facility] at [facility] CATH    LEFT HEART CATHETERIZATION N/A 9/27/2018    Procedure: Left heart cath;  Surgeon: Jose Martin Jama MD;  Location: [facility] CATH;  Service: Cardiology;  Laterality: N/A;    LEFT HEART CATHETERIZATION Left 10/4/2018    Procedure: Left heart cath;  Surgeon: Jose Martin Jama MD;  Location: [facility] CATH;  Service: Cardiology;  Laterality: Left;    LEG SURGERY Left     PARATHYROIDECTOMY      PERCUTANEOUS CORONARY INTERVENTION, ARTERY N/A 10/4/2018    Performed by [facility] at [facility]    PROSTATE SURGERY      ROBOT-ASSISTED LAPAROSCOPIC COLECTOMY USING DA SHERRI XI Right 7/24/2018    Procedure: XI ROBOTIC COLECTOMY;  Surgeon: Farshad Coles MD;  Location: [facility] OR;  Service: Colon and Rectal;  Laterality: Right;    XI ROBOTIC COLECTOMY Right 7/24/2018    Performed by [facility] at [facility] OR     Family History   Problem Relation Age of Onset    Tics Brother     Cancer Brother     Cancer Mother     COPD Mother     Stroke Father      Social History     Socioeconomic History    Marital status:      Spouse name: Not on file    Number of children: Not on file    Years of education: Not on file    Highest education level: Not on file   Social Needs    Financial resource strain: Not on file    Food insecurity - worry: Not on file    Food insecurity - inability: Not on file    Transportation needs - medical: Not on file    Transportation needs - non-medical: Not on file   Occupational History    Not on file   Tobacco Use    Smoking status: Never Smoker    Smokeless tobacco: Never Used   Substance and Sexual Activity    Alcohol use: No    Drug use: No    Sexual activity: Not on file   Other Topics Concern    Not on file   Social History Narrative    Not on file       Review of patient's  allergies indicates:   Allergen Reactions    Plavix [clopidogrel] Rash    Penicillins      Other reaction(s): Unknown    Pramipexole Other (See Comments)     Compulsive gambling at 0.5mg tid       Current Outpatient Medications:     albuterol (PROVENTIL) 5 mg/mL nebulizer solution, Take 2.5 mg by nebulization every 6 (six) hours as needed for Wheezing. Rescue, Disp: , Rfl:     amantadine HCl (SYMMETREL) 50 mg/5 mL Soln, TAKE 10 ML (100MG) ONCE A  DAY, Disp: 900 mL, Rfl: 4    aspirin (ECOTRIN) 81 MG EC tablet, Take 81 mg by mouth once daily., Disp: , Rfl:     benztropine (COGENTIN) 0.5 MG tablet, Take 1 tablet (0.5 mg total) by mouth 2 (two) times daily. (Patient taking differently: Take 0.5 mg by mouth 2 (two) times daily. ), Disp: 180 tablet, Rfl: 3    carbidopa-levodopa  mg (SINEMET CR)  mg TbSR, Take 1 tablet by mouth 3 (three) times daily. , Disp: , Rfl:     cloNIDine (CATAPRES) 0.1 MG tablet, Take 0.1 mg by mouth daily as needed. , Disp: , Rfl:     cyanocobalamin (VITAMIN B-12) 100 MCG tablet, Take 1 tablet (100 mcg total) by mouth once daily., Disp: 30 tablet, Rfl: 11    ferrous sulfate 324 mg (65 mg iron) TbEC, Take 325 mg by mouth once daily., Disp: , Rfl:     hydrALAZINE (APRESOLINE) 50 MG tablet, Take 1 tablet (50 mg total) by mouth 2 (two) times daily., Disp: 60 tablet, Rfl: 5    nitroGLYCERIN (NITROSTAT) 0.4 MG SL tablet, Place 1 tablet (0.4 mg total) under the tongue every 5 (five) minutes as needed., Disp: 25 tablet, Rfl: 0    pravastatin (PRAVACHOL) 40 MG tablet, TAKE 1 TABLET BY MOUTH ONCE DAILY (Patient taking differently: TAKE 1 TABLET BY MOUTH ONCE NIGHTLY), Disp: 90 tablet, Rfl: 1    PROAIR HFA 90 mcg/actuation inhaler, Inhale 2 puffs into the lungs 4 times daily as needed. , Disp: , Rfl:     SYMBICORT 80-4.5 mcg/actuation HFAA, Inhale 2 puffs into the lungs 2 (two) times daily. , Disp: , Rfl:     ticagrelor (BRILINTA) 90 mg tablet, Take 1 tablet (90 mg total) by  "mouth 2 (two) times daily., Disp: 180 tablet, Rfl: 1    vitamin D 1000 units Tab, Take 1,000 Units by mouth once daily., Disp: , Rfl:     amLODIPine (NORVASC) 2.5 MG Tab, Take 1 tablet (2.5 mg total) by mouth every evening., Disp: 90 tablet, Rfl: 1    megestrol (MEGACE) 400 mg/10 mL (40 mg/mL) Susp, 400 mg once daily. , Disp: , Rfl:     Review of Systems   Constitution: Negative for chills, diaphoresis, fever, weakness, malaise/fatigue and night sweats.   HENT: Negative for congestion, hearing loss and nosebleeds.    Eyes: Negative for blurred vision, double vision and visual disturbance.   Cardiovascular: Negative for chest pain, claudication, cyanosis, dyspnea on exertion (MILD, BETTER), irregular heartbeat, leg swelling (OCC), near-syncope, orthopnea, palpitations, paroxysmal nocturnal dyspnea and syncope.   Respiratory: Negative for cough (CHRONIC), hemoptysis, shortness of breath and wheezing (OCC).    Endocrine: Negative for cold intolerance, heat intolerance and polyuria.   Hematologic/Lymphatic: Negative for adenopathy and bleeding problem. Does not bruise/bleed easily.   Skin: Positive for rash (RESOLVED). Negative for color change, itching and nail changes.   Musculoskeletal: Negative for back pain, falls and joint pain.   Gastrointestinal: Negative for abdominal pain, change in bowel habit, dysphagia, heartburn, hematemesis, jaundice, melena and vomiting.   Genitourinary: Negative for dysuria, flank pain and frequency.   Neurological: Negative for brief paralysis, dizziness, focal weakness, light-headedness, loss of balance and tremors (PARKINSON'S).   Psychiatric/Behavioral: Negative for altered mental status, depression and memory loss.   Allergic/Immunologic: Negative for hives and persistent infections.        Objective:      Vitals:    10/26/18 0952   BP: 138/84   Pulse: 76   Weight: 63.6 kg (140 lb 3.4 oz)   Height: 5' 9" (1.753 m)   PainSc: 0-No pain     Body mass index is 20.71 " kg/m².    Physical Exam   Constitutional: He is oriented to person, place, and time. He appears well-developed and well-nourished. He is active.   HENT:   Head: Normocephalic and atraumatic.   Mouth/Throat: Oropharynx is clear and moist and mucous membranes are normal.   Eyes: Conjunctivae and EOM are normal. Pupils are equal, round, and reactive to light.   Neck: Normal range of motion. Neck supple. Normal carotid pulses, no hepatojugular reflux and no JVD present. Carotid bruit is not present. No tracheal deviation, no edema and no erythema present. No thyromegaly present.   Cardiovascular: Normal rate and regular rhythm.  No extrasystoles are present. PMI is not displaced. Exam reveals no gallop, no distant heart sounds, no friction rub and no midsystolic click.   Murmur heard.  High-pitched holosystolic murmur is present with a grade of 2/6 at the apex.  Pulses:       Carotid pulses are 2+ on the right side, and 2+ on the left side.       Radial pulses are 2+ on the right side, and 2+ on the left side.        Femoral pulses are 2+ on the right side, and 2+ on the left side.       Dorsalis pedis pulses are 2+ on the right side, and 2+ on the left side.        Posterior tibial pulses are 2+ on the right side, and 2+ on the left side.   Pulmonary/Chest: Effort normal and breath sounds normal. No accessory muscle usage. No tachypnea and no bradypnea. No respiratory distress.   Abdominal: Soft. Bowel sounds are normal. He exhibits no distension and no mass. There is no hepatosplenomegaly. There is no tenderness. There is no CVA tenderness.   Musculoskeletal: Normal range of motion. He exhibits no edema or deformity.   Lymphadenopathy:     He has no cervical adenopathy.   Neurological: He is alert and oriented to person, place, and time. He has normal strength. He displays tremor. No cranial nerve deficit.   SLIGHTLY SLOW GAIT   Skin: Skin is warm and dry. No cyanosis or erythema. No pallor.   Psychiatric: He has a  normal mood and affect. His speech is normal and behavior is normal.               ..    Chemistry        Component Value Date/Time     10/04/2018 0627    K 3.6 10/04/2018 0627     10/04/2018 0627    CO2 23 10/04/2018 0627    BUN 31 (H) 10/04/2018 0627    CREATININE 1.76 (H) 10/04/2018 0627    GLU 98 10/04/2018 0627        Component Value Date/Time    CALCIUM 9.5 10/04/2018 0627    ALKPHOS 109 04/08/2016 1600    AST 17 04/08/2016 1600    ALT <5 (L) 04/08/2016 1600    BILITOT 0.3 04/08/2016 1600    ESTGFRAFRICA 46 (A) 10/04/2018 0627    EGFRNONAA 39 (A) 10/04/2018 0627            ..No results found for: CHOL  No results found for: HDL  No results found for: LDLCALC  No results found for: TRIG  No results found for: CHOLHDL  ..  Lab Results   Component Value Date    WBC 6.14 10/04/2018    HGB 13.5 (L) 10/04/2018    HCT 40.7 10/04/2018    MCV 92 10/04/2018     10/04/2018       Test(s) Reviewed  I have reviewed the following in detail:  [] Stress test   [] Angiography   [] Echocardiogram   [] Labs   [] Other:       Assessment:         ICD-10-CM ICD-9-CM   1. Coronary artery disease of native artery of native heart with stable angina pectoris I25.118 414.01     413.9   2. Drug side effects, initial encounter T50.905A E947.9   3. Long term (current) use of antithrombotics/antiplatelets Z79.02 V58.63   4. Essential hypertension I10 401.9     Problem List Items Addressed This Visit        Cardiac/Vascular    Essential hypertension    Coronary artery disease of native artery of native heart with stable angina pectoris - Primary    Relevant Orders    SCHEDULED EKG 12-LEAD (to Muse)       Hematology    Long term (current) use of antithrombotics/antiplatelets       Other    Drug side effects, initial encounter           Plan:     EKG SR, . LAD, CHANGE PLAVIX TO BRILINTA, ADD NORVASC FOR BP, DECREASE HYDRALAZINE AS POSSIBLE, ALL CV CLINICALLY STABLE, NO ANGINA, NO HF, NO TIA, NO CLINICAL ARRHYTHMIA,CONTINUE  CURRENT MEDS, EDUCATION, DIET, EXERCISE, RTC IN 4 MO WITH LABS, EXPLAINED PLAN TO PT AND WIFE      Coronary artery disease of native artery of native heart with stable angina pectoris  -     SCHEDULED EKG 12-LEAD (to Muse); Future; Expected date: 04/26/2019    Drug side effects, initial encounter    Long term (current) use of antithrombotics/antiplatelets    Essential hypertension    Other orders  -     amLODIPine (NORVASC) 2.5 MG Tab; Take 1 tablet (2.5 mg total) by mouth every evening.  Dispense: 90 tablet; Refill: 1  -     ticagrelor (BRILINTA) 90 mg tablet; Take 1 tablet (90 mg total) by mouth 2 (two) times daily.  Dispense: 180 tablet; Refill: 1    RTC Low level/low impact aerobic exercise 5x's/wk. Heart healthy diet and risk factor modification.    See labs and med orders.    Aerobic exercise 5x's/wk. Heart healthy diet and risk factor modification.    See labs and med orders.

## 2018-11-26 ENCOUNTER — TELEPHONE (OUTPATIENT)
Dept: NEUROLOGY | Facility: CLINIC | Age: 68
End: 2018-11-26

## 2018-11-26 ENCOUNTER — PATIENT MESSAGE (OUTPATIENT)
Dept: NEUROLOGY | Facility: CLINIC | Age: 68
End: 2018-11-26

## 2018-11-26 NOTE — TELEPHONE ENCOUNTER
----- Message from Jacquelyn Gonzalez sent at 11/26/2018  9:36 AM CST -----  Contact: Marissa (wife) @ [phone]  Calling to schedule a f/u appt with Dr Lena mora but the 1st available is not until 2-5-19.  Pts wife is requesting to speak with someone about an appt in [place] if possible but before the end of the year.

## 2018-12-17 ENCOUNTER — TELEPHONE (OUTPATIENT)
Dept: NEUROLOGY | Facility: CLINIC | Age: 68
End: 2018-12-17

## 2018-12-17 DIAGNOSIS — G20.A1 PD (PARKINSON'S DISEASE): Primary | ICD-10-CM

## 2018-12-17 NOTE — TELEPHONE ENCOUNTER
----- Message from Kaylyn Kennedy sent at 12/17/2018  9:47 AM CST -----  Contact: Pt   Pt was calling to juan manuel a referral for Physical Therapy.    PT would like a call back at [phone]    Thank You

## 2018-12-26 DIAGNOSIS — G20.A1 PD (PARKINSON'S DISEASE): ICD-10-CM

## 2019-01-01 RX ORDER — BENZTROPINE MESYLATE 0.5 MG/1
TABLET ORAL
Qty: 180 TABLET | Refills: 3 | Status: SHIPPED | OUTPATIENT
Start: 2019-01-01 | End: 2019-09-19 | Stop reason: SINTOL

## 2019-01-02 ENCOUNTER — TELEPHONE (OUTPATIENT)
Dept: NEUROLOGY | Facility: CLINIC | Age: 69
End: 2019-01-02

## 2019-01-02 NOTE — TELEPHONE ENCOUNTER
----- Message from Jacquelyn Gonzalez sent at 1/2/2019 10:15 AM CST -----  Contact: Christi    [facility]    [phone]  Calling for the status of referral / order for PT.  Pls fax to [phone].

## 2019-01-03 ENCOUNTER — TELEPHONE (OUTPATIENT)
Dept: CARDIOLOGY | Facility: CLINIC | Age: 69
End: 2019-01-03

## 2019-01-03 NOTE — TELEPHONE ENCOUNTER
----- Message from Tyson Cody sent at 1/3/2019  9:18 AM CST -----  Patient Requesting Order    Order Needed: Outpatient Physical Therapy. Pls fax to .    Communication Preference: Yovani thomas/ [facility] @ [phone]    Additional Information:

## 2019-01-03 NOTE — TELEPHONE ENCOUNTER
----- Message from Lulu Juarez sent at 1/3/2019  1:21 PM CST -----  Contact: [place] with [facility]  [place] with [facility] pharmacy [phone] REF # [id number] called regarding [facility] for above patient. They are requesting one of the following: verbal approval. Thanks!      [facility] - [address]  [address]  [facility] [address]  Phone: [phone] Fax: [phone]

## 2019-01-25 ENCOUNTER — TELEPHONE (OUTPATIENT)
Dept: NEUROLOGY | Facility: CLINIC | Age: 69
End: 2019-01-25

## 2019-01-25 NOTE — TELEPHONE ENCOUNTER
Called and spoke to Sunita regarding a sooner appt with . I stated that we have an opening for this Tuesday Jan 29 at 9:40AM. Plus May 2 at 11:20AM in [place]

## 2019-01-25 NOTE — TELEPHONE ENCOUNTER
----- Message from Jalyn Dominguez sent at 1/25/2019  9:22 AM CST -----  Needs Advice    Reason for call: calling regarding a call he received to schedule the patient sooner  but [facility], asking what is the offered new appt date/time and location        Communication Preference:Marissa (wife) @ [phone]    Additional Information:

## 2019-01-29 ENCOUNTER — OFFICE VISIT (OUTPATIENT)
Dept: NEUROLOGY | Facility: CLINIC | Age: 69
End: 2019-01-29
Payer: MEDICARE

## 2019-01-29 VITALS
HEART RATE: 68 BPM | BODY MASS INDEX: 22.79 KG/M2 | DIASTOLIC BLOOD PRESSURE: 81 MMHG | HEIGHT: 69 IN | SYSTOLIC BLOOD PRESSURE: 143 MMHG | WEIGHT: 153.88 LBS

## 2019-01-29 DIAGNOSIS — G31.84 MILD COGNITIVE IMPAIRMENT WITH MEMORY LOSS: ICD-10-CM

## 2019-01-29 DIAGNOSIS — D12.0 ADENOMA OF CECUM: ICD-10-CM

## 2019-01-29 DIAGNOSIS — G20.A1 PD (PARKINSON'S DISEASE): Primary | ICD-10-CM

## 2019-01-29 DIAGNOSIS — I25.118 CORONARY ARTERY DISEASE OF NATIVE ARTERY OF NATIVE HEART WITH STABLE ANGINA PECTORIS: ICD-10-CM

## 2019-01-29 DIAGNOSIS — I25.10 CAD, MULTIPLE VESSEL: ICD-10-CM

## 2019-01-29 PROBLEM — I10 MALIGNANT HYPERTENSION: Status: RESOLVED | Noted: 2018-02-26 | Resolved: 2019-01-29

## 2019-01-29 PROBLEM — R06.02 SOB (SHORTNESS OF BREATH): Status: RESOLVED | Noted: 2018-08-27 | Resolved: 2019-01-29

## 2019-01-29 PROBLEM — I44.4 LEFT ANTERIOR HEMIBLOCK: Status: RESOLVED | Noted: 2018-02-26 | Resolved: 2019-01-29

## 2019-01-29 PROBLEM — R94.39 ABNORMAL NUCLEAR STRESS TEST: Status: RESOLVED | Noted: 2018-08-27 | Resolved: 2019-01-29

## 2019-01-29 PROBLEM — T50.905A DRUG SIDE EFFECTS, INITIAL ENCOUNTER: Status: RESOLVED | Noted: 2018-10-26 | Resolved: 2019-01-29

## 2019-01-29 PROCEDURE — 99213 OFFICE O/P EST LOW 20 MIN: CPT | Mod: PBBFAC | Performed by: PSYCHIATRY & NEUROLOGY

## 2019-01-29 PROCEDURE — 99999 PR PBB SHADOW E&M-EST. PATIENT-LVL III: CPT | Mod: PBBFAC,,, | Performed by: PSYCHIATRY & NEUROLOGY

## 2019-01-29 PROCEDURE — 99214 PR OFFICE/OUTPT VISIT, EST, LEVL IV, 30-39 MIN: ICD-10-PCS | Mod: S$PBB,,, | Performed by: PSYCHIATRY & NEUROLOGY

## 2019-01-29 PROCEDURE — 99999 PR PBB SHADOW E&M-EST. PATIENT-LVL III: ICD-10-PCS | Mod: PBBFAC,,, | Performed by: PSYCHIATRY & NEUROLOGY

## 2019-01-29 PROCEDURE — 99214 OFFICE O/P EST MOD 30 MIN: CPT | Mod: S$PBB,,, | Performed by: PSYCHIATRY & NEUROLOGY

## 2019-01-29 NOTE — PROGRESS NOTES
"Last Name: Sunita LOMBARDO (pe-teet). Chief Complaints during this visit:   f/u visit for PD     History of present illness:   69 y.o. M returns in f/u for PD.  Accompanied by wife.  Not seen in a year.  Had PCI of LAD last fall and partial colectomy a few months prior.    Noticing worse in balance.  Fell 12/5 while going into store, injured right shoulder.  Just got an injection for shoulder which has helped.  Misses mid-day dose of levodopa often.    More clumsy.  Speech more difficult to understand.  Falls asleep a lot, while watching TV.  Difficult sleeping at night.  No visions, hallucinations.    Appetite has increased, gained about 20 lbs.      Interval history 2/1/18:  Balance is worsening.  No falls.  Would like to get PT.  Memory is worsening per patient, but wife says she doesn't feel there's much problem.  Renal insufficiency is now "above 2", so now on low phosphorus diet as of yesterday.  Wife worried as "everything I read about PD says you have to have protein."    Interval history 10/6/17:  Feels like he is doing ok, over-all, just losing a little more balance.  No falls.  Sleep is better.  Speech is getting harder to understand.  Insurer won't cover any more ST this year.  No choking.  Would like to switch to liquid amantadine as free!  Amantadine capsules are $90.    Interval history 7/5/17:  Taking meds regular.  Main complaint is his sleep.  If he sleeps poorly, then he is spacey the next day.  Balance is off, but no falls.    Interval history 4/6/17:  He states that overall, he is feeling fair. Tremor is not bad but balance seems to be doing worse. Went to physical therapy this morning. Has order to continue for another 8 weeks.   Memory is not doing to good. Having difficulty remembering peoples names and addresses.   Having some trouble sleeping too. Hard to fall asleep.   Dropping things and having touble buttoning buttons   Gets an occasional scary look in his eyes.  He and his wife state they " "never got the results of his swallowing study  Still having persistent tongue protrusion.      Interval history 1/4/17:  No complaints.  His main complaint is fatigue and also "feeling fatigue" when he gets out in the sun.  No falls since last seen.  Stumbles, but no falls.  Sleeping "oK', though he lays in bed awake for a couple hours.  Sleeps better in easy chair.  Gave his guitar away as was too hard to play.  Has more trouble making decisions.  Has cravings for sweets.    Interval history 8/5/16:  Never took the remeron because they didn't want to take more medication.  Tremors come/go.  Weight is back up.  Goes to therapy 3x/week.  If he doesn't he gets too stiff.  Back pain, daily.  Relieved by sitting down.  Easily fatigued.    From my note 4/8/16:  Continues finds the sinemet CR better for him, better control of tremors.  Balance still an issue, but no falls.  Reluctant to use a cane.  Difficult to fall asleep at night.  Active dreaming.  No memory problems and no hallucinations.    Has episodes of staring.    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  Lost some weight.  During month in hospital, though, he was not shaking.  Now they've come back "twice as bad."    Oxycodone made him hallucinate, so stopped after a week (during hospitalization).      II. Review of systems As in HPI, otherwise, balance 3  systems reviewed and are negative.   III. Past Medical history: PD left tremor-type 2009 (Dr. Ramos dx); "CVA" 1998; Right shoulder dyskinesia vs tic since at least 1994; COPD; HTN   Family history: brother with tics?   Social history: No tob or etoh, lives with family, , disabled .  Enjoys hunting and fishing.    Current neuro medications: benztropine 0.5mg BID, amantadine 100qam, carbidopa-levodopa 50/200 tid (9am, noon, 9pm)  Allergies: penicillin, mirapex caused compulsive gambling     IV. Physical Exam (Includes Part III of Unified Parkinsons Disease Rating Scale " "2008)       Vitals:    01/29/19 0937   BP: (!) 143/81   Pulse: 68   Weight: 69.8 kg (153 lb 14.1 oz)   Height: 5' 9" (1.753 m)     Wt Readings from Last 5 Encounters:   01/29/19 69.8 kg (153 lb 14.1 oz)   10/26/18 63.6 kg (140 lb 3.4 oz)   10/04/18 65.8 kg (145 lb)   09/27/18 65.8 kg (145 lb)   08/27/18 65 kg (143 lb 4.8 oz)     General appearance: Well nourished, well developed, no acute distress   -------------------------------------------------------------   Facial Expression: "poker face" (1)   Affect: full   Orientation to time & place: Oriented to time, place, person and situation   Memory:  Not tested  Speech: Slight loss of expression, diction or tone (1)     UPDRS Motor Examination      Speech  1 - Slight loss of expression, dictation, and/or volumn.   Facial Expression  2 - Slight but definitely abnormal diminution of facial expression.    Rigidity     Neck 1 - Slight or detectable only when activated by mirror or other movements.   Upper Extremity: Right 0 - Absent.   Upper Extremity: Left 1 - Slight or detectable only when activated by mirror or other movements.   Lower Extremity: Right 0 - Absent.   Lower Extremity: Left 0 - Absent.     Finger Taps      right 1 - Mild slowing and/or reduction in amplitude.   left 2 - Moderately impaired. Definite and early fatiguing. May have occasional arrests in movement.   Hand Movements      right 1 - Mild slowing and/or reduction in amplitude.   left 2 - Moderately impaired. Definite and early fatiguing. May have occasional arrests in movement.   Pronation/supination of Hands      right 1 - Mild slowing and/or reduction in amplitude.   left 2 - Moderately impaired. Definite and early fatiguing. May have occasional arrests in movement.     Toe Tapping    right 1 - Mild slowing and/or reduction in amplitude.   left 2 - Moderately impaired. Definite and early fatiguing. May have occasional arrests in movement.     Leg Agility      right 1 - Mild slowing and/or " reduction in amplitude.   left 2 - Moderately impaired. Definite and early fatiguing. May have occasional arrests in movement.   Arising from Chair  2 - Pushes self up from arms of seat.   Posture  1 - Not quite erect, slightly stooped posture; could be normal for older person.   Gait  2 - Walks with difficulty, but requires little or no assistance; may have some festination, short steps, or propulsion.   Freezing of gait 0   Postural Stability (Response to sudden, strong posterior displacement produced by pull on shoulders while patient erect with eyes open and feet slightly apart.   Deferred   Body Bradykinesia and Hypokinesia (Combining slowness, hesitancy, decreased armswing, small amplitude, and poverty of movement in general)  2 - Mild degree of slowness and poverty of movement which is definitely abnormal.     Tremor at Rest:      Face, lips, chin 0 - Absent.    Hands:      right 0 - Absent.    left 2 - Mild in amplitude and persistent. Or moderate in amplitude, but only intermittently present.    Feet:     right 0 - Absent.    left 0 - Absent.    Constancy of REST tremor: 2: Mild: Tremor at rest is present 26-50% of the entire examination period.    Postural tremor:    right 0 - Absent.    left 0 - Absent.    Kinetic tremor:    right 0 - Absent.    left 0 - Absent.    Dyskinesias present? No       Total Motor UPDRS:  29        V. Laboratory/ Radiological Data:     Lab Results   Component Value Date    CREATININE 1.76 (H) 10/04/2018        Lab Results   Component Value Date    [id number] 304 04/08/2016     B1 61  spep normal    VI. Assessment and Plan    Problem List Items Addressed This Visit        1 - High    PD (Parkinson's disease) - Primary    Overview     classic type, left-predominant tremor         Current Assessment & Plan     Physically appears unchanged on exam, but he took his medications this morning.  I suspect missing mid-day doses of levodopa are contributing to falls.   -> take on time daily  for a week and message me   -> consider moving times of c/l to 9/ 12/4 though adherence is an issue.   -> consider adding second dose of amantadine at 12, though adherence is an issue.            2     Mild cognitive impairment with memory loss    Overview     2/1/18 [place] Cognitive Assessment:   23/30            6     CAD, multiple vessel    Coronary artery disease of native artery of native heart with stable angina pectoris    Current Assessment & Plan     S/p stents 9/2018            Unprioritized    RESOLVED: Adenoma of cecum    Current Assessment & Plan     S/p partial colectomy 8/2018                 Follow-up in about 4 months (around 5/29/2019).

## 2019-01-29 NOTE — ASSESSMENT & PLAN NOTE
Physically appears unchanged on exam, but he took his medications this morning.  I suspect missing mid-day doses of levodopa are contributing to falls.   -> take on time daily for a week and message me   -> consider moving times of c/l to 9/ 12/4 though adherence is an issue.   -> consider adding second dose of amantadine at 12, though adherence is an issue.

## 2019-01-29 NOTE — PATIENT INSTRUCTIONS
Please take your parkinson's medications on time daily for a week.  I need to know how much that noon dose of carbidopa-levodopa is doing.    Message me in a week and let me know.  I have a couple other ideas on how to increase your medications.

## 2019-03-04 ENCOUNTER — TELEPHONE (OUTPATIENT)
Dept: NEUROLOGY | Facility: CLINIC | Age: 69
End: 2019-03-04

## 2019-03-04 DIAGNOSIS — G20.A1 PD (PARKINSON'S DISEASE): Primary | ICD-10-CM

## 2019-03-06 ENCOUNTER — OFFICE VISIT (OUTPATIENT)
Dept: CARDIOLOGY | Facility: CLINIC | Age: 69
End: 2019-03-06
Payer: MEDICARE

## 2019-03-06 VITALS
SYSTOLIC BLOOD PRESSURE: 112 MMHG | OXYGEN SATURATION: 97 % | WEIGHT: 153.25 LBS | DIASTOLIC BLOOD PRESSURE: 70 MMHG | BODY MASS INDEX: 22.7 KG/M2 | HEART RATE: 80 BPM | HEIGHT: 69 IN

## 2019-03-06 DIAGNOSIS — I34.0 NON-RHEUMATIC MITRAL REGURGITATION: ICD-10-CM

## 2019-03-06 DIAGNOSIS — I10 HYPERTENSION, UNSPECIFIED TYPE: ICD-10-CM

## 2019-03-06 DIAGNOSIS — E78.00 HYPERCHOLESTEROLEMIA: ICD-10-CM

## 2019-03-06 DIAGNOSIS — I10 ESSENTIAL HYPERTENSION: ICD-10-CM

## 2019-03-06 DIAGNOSIS — Z79.02 LONG TERM (CURRENT) USE OF ANTITHROMBOTICS/ANTIPLATELETS: ICD-10-CM

## 2019-03-06 DIAGNOSIS — I25.118 CORONARY ARTERY DISEASE OF NATIVE ARTERY OF NATIVE HEART WITH STABLE ANGINA PECTORIS: Primary | ICD-10-CM

## 2019-03-06 PROCEDURE — 99214 PR OFFICE/OUTPT VISIT, EST, LEVL IV, 30-39 MIN: ICD-10-PCS | Mod: S$GLB,,, | Performed by: INTERNAL MEDICINE

## 2019-03-06 PROCEDURE — 99214 OFFICE O/P EST MOD 30 MIN: CPT | Mod: S$GLB,,, | Performed by: INTERNAL MEDICINE

## 2019-03-06 RX ORDER — HYDRALAZINE HYDROCHLORIDE 50 MG/1
50 TABLET, FILM COATED ORAL 2 TIMES DAILY
Qty: 180 TABLET | Refills: 1 | Status: SHIPPED | OUTPATIENT
Start: 2019-03-06 | End: 2019-07-05 | Stop reason: SDUPTHER

## 2019-03-06 NOTE — PROGRESS NOTES
Subjective:    Patient ID:  Yannick Dorsey is a 69 y.o. male who presents for Coronary Artery Disease; Hypertension; and Hyperlipidemia        HPI    Discussed labs and GOALS CR 1.77, HB 12.7, DOING OK, LESS SOB,SEE ROS  Past Medical History:   Diagnosis Date    Adenoma of cecum 7/24/2018    Broken fibula 2/23/2015    left calf    Broken tibia 2/23/2015    left calve    Cancer     SKIN    Compulsive gambling 7/31/2012    COPD (chronic obstructive pulmonary disease)     Coronary artery disease     Heart murmur     HTN (hypertension)     Hyperlipidemia     Kidney disease, chronic, stage III (GFR 30-59 ml/min)     PD (Parkinson's disease) 2009    left tremor-dominant    Polyp of colon     Stroke 1998    Tic 1994    right shoulder     Past Surgical History:   Procedure Laterality Date    Angiogram Renal Bilateral Selective N/A 9/27/2018    [facility] CATH    Angiogram, Abdomen N/A 9/27/2018    [facility] CATH    CARDIAC CATHETERIZATION      COLECTOMY Right 7/24/2018    [facility] OR    COLONOSCOPY N/A 6/1/2018    [facility] ENDO    INSERTION, STENT, CORONARY ARTERY N/A 10/4/2018    [facility] CATH    JOINT REPLACEMENT Bilateral     knees    Left heart cath Left 10/4/2018    [facility] CATH    Left heart cath N/A 9/27/2018    [facility] CATH    LEG SURGERY Left     PARATHYROIDECTOMY      PERCUTANEOUS CORONARY INTERVENTION, ARTERY N/A 10/4/2018    [facility] CATH    PROSTATE SURGERY      XI ROBOTIC COLECTOMY Right 7/24/2018    [facility] OR     Family History   Problem Relation Age of Onset    Tics Brother     Cancer Brother     Cancer Mother     COPD Mother     Stroke Father      Social History     Socioeconomic History    Marital  status:      Spouse name: Not on file    Number of children: Not on file    Years of education: Not on file    Highest education level: Not on file   Social Needs    Financial resource strain: Not on file    Food insecurity - worry: Not on file    Food insecurity - inability: Not on file    Transportation needs - medical: Not on file    Transportation needs - non-medical: Not on file   Occupational History    Not on file   Tobacco Use    Smoking status: Never Smoker    Smokeless tobacco: Never Used   Substance and Sexual Activity    Alcohol use: No    Drug use: No    Sexual activity: Not on file   Other Topics Concern    Not on file   Social History Narrative    Not on file       Review of patient's allergies indicates:   Allergen Reactions    Plavix [clopidogrel] Rash    Penicillins      Other reaction(s): Unknown    Pramipexole Other (See Comments)     Compulsive gambling at 0.5mg tid       Current Outpatient Medications:     albuterol (PROVENTIL) 5 mg/mL nebulizer solution, Take 2.5 mg by nebulization every 6 (six) hours as needed for Wheezing. Rescue, Disp: , Rfl:     amantadine HCl (SYMMETREL) 50 mg/5 mL Soln, TAKE 10 ML (100MG) ONCE A  DAY, Disp: 900 mL, Rfl: 4    aspirin (ECOTRIN) 81 MG EC tablet, Take 81 mg by mouth once daily., Disp: , Rfl:     benztropine (COGENTIN) 0.5 MG tablet, TAKE 1 TABLET BY MOUTH TWO  TIMES DAILY, Disp: 180 tablet, Rfl: 3    carbidopa-levodopa  mg (SINEMET CR)  mg TbSR, Take 1 tablet by mouth 3 (three) times daily. , Disp: , Rfl:     cyanocobalamin (VITAMIN B-12) 100 MCG tablet, Take 1 tablet (100 mcg total) by mouth once daily., Disp: 30 tablet, Rfl: 11    ferrous sulfate 324 mg (65 mg iron) TbEC, Take 325 mg by mouth once daily., Disp: , Rfl:     hydrALAZINE (APRESOLINE) 50 MG tablet, Take 1 tablet (50 mg total) by mouth 2 (two) times daily., Disp: 180 tablet, Rfl: 1    pravastatin (PRAVACHOL) 40 MG tablet, TAKE 1 TABLET BY MOUTH ONCE  DAILY (Patient taking differently: TAKE 1 TABLET BY MOUTH ONCE NIGHTLY), Disp: 90 tablet, Rfl: 1    PROAIR HFA 90 mcg/actuation inhaler, Inhale 2 puffs into the lungs 4 times daily as needed. , Disp: , Rfl:     SYMBICORT 80-4.5 mcg/actuation HFAA, Inhale 2 puffs into the lungs 2 (two) times daily. , Disp: , Rfl:     ticagrelor (BRILINTA) 90 mg tablet, Take 1 tablet (90 mg total) by mouth 2 (two) times daily., Disp: 180 tablet, Rfl: 1    vitamin D 1000 units Tab, Take 1,000 Units by mouth once daily., Disp: , Rfl:     cloNIDine (CATAPRES) 0.1 MG tablet, Take 0.1 mg by mouth daily as needed. , Disp: , Rfl:     INV amlodipine (NORVASC) 2.5 MG Tab, Take 1 tablet (2.5 mg total) by mouth every evening., Disp: 90 tablet, Rfl: 1    nitroGLYCERIN (NITROSTAT) 0.4 MG SL tablet, Place 1 tablet (0.4 mg total) under the tongue every 5 (five) minutes as needed., Disp: 25 tablet, Rfl: 0    Review of Systems   Constitution: Negative for chills, diaphoresis, fever, weakness, malaise/fatigue and night sweats.   HENT: Negative for congestion and nosebleeds (SOME).    Eyes: Negative for blurred vision, double vision and visual disturbance.   Cardiovascular: Negative for chest pain, claudication, cyanosis, dyspnea on exertion (MILD, BETTER), irregular heartbeat, leg swelling (OCC), near-syncope, orthopnea, palpitations, paroxysmal nocturnal dyspnea and syncope.   Respiratory: Negative for cough (CHRONIC), hemoptysis, shortness of breath and wheezing (OCC).    Endocrine: Negative for cold intolerance, heat intolerance and polyuria.   Hematologic/Lymphatic: Negative for adenopathy and bleeding problem. Does not bruise/bleed easily.   Skin: Positive for rash (RESOLVED). Negative for color change, itching and nail changes.   Musculoskeletal: Negative for back pain, falls and joint pain.   Gastrointestinal: Negative for abdominal pain, change in bowel habit, dysphagia, heartburn, hematemesis, jaundice, melena and vomiting.  "  Genitourinary: Negative for dysuria, flank pain and frequency.   Neurological: Negative for brief paralysis, dizziness, focal weakness, light-headedness, loss of balance and tremors (PARKINSON'S).   Psychiatric/Behavioral: Negative for altered mental status, depression and memory loss.   Allergic/Immunologic: Negative for hives and persistent infections.        Objective:      Vitals:    03/06/19 0801   BP: 112/70   Pulse: 80   SpO2: 97%   Weight: 69.5 kg (153 lb 3.5 oz)   Height: 5' 9" (1.753 m)   PainSc: 0-No pain     Body mass index is 22.63 kg/m².    Physical Exam   Constitutional: He is oriented to person, place, and time. He appears well-developed and well-nourished. He is active.   HENT:   Head: Normocephalic and atraumatic.   Mouth/Throat: Oropharynx is clear and moist and mucous membranes are normal.   Eyes: Conjunctivae and EOM are normal. Pupils are equal, round, and reactive to light.   Neck: Normal range of motion. Neck supple. Normal carotid pulses, no hepatojugular reflux and no JVD present. Carotid bruit is not present. No tracheal deviation, no edema and no erythema present. No thyromegaly present.   Cardiovascular: Normal rate and regular rhythm.  No extrasystoles are present. PMI is not displaced. Exam reveals no gallop, no distant heart sounds, no friction rub and no midsystolic click.   Murmur heard.  High-pitched holosystolic murmur is present with a grade of 2/6 at the apex.  Pulses:       Carotid pulses are 2+ on the right side, and 2+ on the left side.       Radial pulses are 2+ on the right side, and 2+ on the left side.        Femoral pulses are 2+ on the right side, and 2+ on the left side.       Dorsalis pedis pulses are 2+ on the right side, and 2+ on the left side.        Posterior tibial pulses are 2+ on the right side, and 2+ on the left side.   Pulmonary/Chest: Effort normal and breath sounds normal. No accessory muscle usage. No tachypnea and no bradypnea. No respiratory " distress.   Abdominal: Soft. Bowel sounds are normal. He exhibits no distension and no mass. There is no hepatosplenomegaly. There is no tenderness. There is no CVA tenderness.   Musculoskeletal: Normal range of motion. He exhibits no edema or deformity.   Lymphadenopathy:     He has no cervical adenopathy.   Neurological: He is alert and oriented to person, place, and time. He has normal strength. He displays tremor. No cranial nerve deficit.   SLIGHTLY SLOW GAIT   Skin: Skin is warm and dry. No cyanosis or erythema. No pallor.   Psychiatric: He has a normal mood and affect. His speech is normal and behavior is normal.               ..    Chemistry        Component Value Date/Time     10/04/2018 0627    K 3.6 10/04/2018 0627     10/04/2018 0627    CO2 23 10/04/2018 0627    BUN 31 (H) 10/04/2018 0627    CREATININE 1.76 (H) 10/04/2018 0627    GLU 98 10/04/2018 0627        Component Value Date/Time    CALCIUM 9.5 10/04/2018 0627    ALKPHOS 109 04/08/2016 1600    AST 17 04/08/2016 1600    ALT <5 (L) 04/08/2016 1600    BILITOT 0.3 04/08/2016 1600    ESTGFRAFRICA 46 (A) 10/04/2018 0627    EGFRNONAA 39 (A) 10/04/2018 0627            ..No results found for: CHOL  No results found for: HDL  No results found for: LDLCALC  No results found for: TRIG  No results found for: CHOLHDL  ..  Lab Results   Component Value Date    WBC 6.14 10/04/2018    HGB 13.5 (L) 10/04/2018    HCT 40.7 10/04/2018    MCV 92 10/04/2018     10/04/2018       Test(s) Reviewed  I have reviewed the following in detail:  [] Stress test   [] Angiography   [] Echocardiogram   [x] Labs   [] Other:       Assessment:         ICD-10-CM ICD-9-CM   1. Coronary artery disease of native artery of native heart with stable angina pectoris I25.118 414.01     413.9   2. Long term (current) use of antithrombotics/antiplatelets Z79.02 V58.63   3. Non-rheumatic mitral regurgitation I34.0 424.0   4. Essential hypertension I10 401.9   5.  Hypercholesterolemia E78.00 272.0   6. Hypertension, unspecified type I10 401.9     Problem List Items Addressed This Visit        Cardiac/Vascular    Non-rheumatic mitral regurgitation    Relevant Orders    Comprehensive metabolic panel    Hypercholesterolemia    Relevant Orders    Comprehensive metabolic panel    Lipid panel    Essential hypertension    Relevant Orders    Comprehensive metabolic panel    Coronary artery disease of native artery of native heart with stable angina pectoris - Primary    Relevant Orders    Comprehensive metabolic panel       Hematology    Long term (current) use of antithrombotics/antiplatelets    Relevant Orders    Comprehensive metabolic panel      Other Visit Diagnoses     Hypertension, unspecified type        Relevant Medications    hydrALAZINE (APRESOLINE) 50 MG tablet           Plan:         DAILY BRILINTA.  ALL CV CLINICALLY STABLE, CLASS 1 ANGINA, NO HF, NO TIA, NO CLINICAL ARRHYTHMIA,CONTINUE CURRENT MEDS, EDUCATION, DIET, EXERCISE, RETURN TO CLINIC IN 6 MONTHS WITH LABS DISCUSSED PLAN WITH PATIENT AND WIFE  Coronary artery disease of native artery of native heart with stable angina pectoris  -     Comprehensive metabolic panel; Future; Expected date: 03/06/2019    Long term (current) use of antithrombotics/antiplatelets  -     Comprehensive metabolic panel; Future; Expected date: 03/06/2019    Non-rheumatic mitral regurgitation  -     Comprehensive metabolic panel; Future; Expected date: 03/06/2019    Essential hypertension  -     Comprehensive metabolic panel; Future; Expected date: 03/06/2019    Hypercholesterolemia  -     Comprehensive metabolic panel; Future; Expected date: 03/06/2019  -     Lipid panel; Future; Expected date: 03/06/2019    Hypertension, unspecified type  -     hydrALAZINE (APRESOLINE) 50 MG tablet; Take 1 tablet (50 mg total) by mouth 2 (two) times daily.  Dispense: 180 tablet; Refill: 1    Other orders  -     ticagrelor (BRILINTA) 90 mg tablet; Take 1  tablet (90 mg total) by mouth 2 (two) times daily.  Dispense: 180 tablet; Refill: 1  -     INV amlodipine (NORVASC) 2.5 MG Tab; Take 1 tablet (2.5 mg total) by mouth every evening.  Dispense: 90 tablet; Refill: 1    RTC Low level/low impact aerobic exercise 5x's/wk. Heart healthy diet and risk factor modification.    See labs and med orders.    Aerobic exercise 5x's/wk. Heart healthy diet and risk factor modification.    See labs and med orders.

## 2019-03-18 RX ORDER — PRAVASTATIN SODIUM 40 MG/1
TABLET ORAL
Qty: 90 TABLET | Refills: 1 | Status: SHIPPED | OUTPATIENT
Start: 2019-03-18 | End: 2019-07-05 | Stop reason: SDUPTHER

## 2019-05-02 ENCOUNTER — OFFICE VISIT (OUTPATIENT)
Dept: NEUROLOGY | Facility: CLINIC | Age: 69
End: 2019-05-02
Payer: MEDICARE

## 2019-05-02 VITALS
BODY MASS INDEX: 22.96 KG/M2 | HEART RATE: 68 BPM | SYSTOLIC BLOOD PRESSURE: 164 MMHG | WEIGHT: 155 LBS | HEIGHT: 69 IN | DIASTOLIC BLOOD PRESSURE: 93 MMHG

## 2019-05-02 DIAGNOSIS — G31.84 MILD COGNITIVE IMPAIRMENT WITH MEMORY LOSS: ICD-10-CM

## 2019-05-02 DIAGNOSIS — G20.A1 PD (PARKINSON'S DISEASE): Primary | ICD-10-CM

## 2019-05-02 DIAGNOSIS — M21.6X2 INVERSION DEFORMITY OF FOOT, LEFT: ICD-10-CM

## 2019-05-02 DIAGNOSIS — R26.89 BALANCE DISORDER: ICD-10-CM

## 2019-05-02 PROCEDURE — 99214 OFFICE O/P EST MOD 30 MIN: CPT | Mod: S$PBB,,, | Performed by: PSYCHIATRY & NEUROLOGY

## 2019-05-02 PROCEDURE — 99213 OFFICE O/P EST LOW 20 MIN: CPT | Mod: PBBFAC,PO | Performed by: PSYCHIATRY & NEUROLOGY

## 2019-05-02 PROCEDURE — 99999 PR PBB SHADOW E&M-EST. PATIENT-LVL III: CPT | Mod: PBBFAC,,, | Performed by: PSYCHIATRY & NEUROLOGY

## 2019-05-02 PROCEDURE — 99214 PR OFFICE/OUTPT VISIT, EST, LEVL IV, 30-39 MIN: ICD-10-PCS | Mod: S$PBB,,, | Performed by: PSYCHIATRY & NEUROLOGY

## 2019-05-02 PROCEDURE — 99999 PR PBB SHADOW E&M-EST. PATIENT-LVL III: ICD-10-PCS | Mod: PBBFAC,,, | Performed by: PSYCHIATRY & NEUROLOGY

## 2019-05-02 NOTE — ASSESSMENT & PLAN NOTE
Mild-moderate balance disorder, predominantly from left foot issues and neuropathy (unknown etiology).  PD is playing a role, albeit, minor.   -> encouraged use of cane   -> order sent to PT to assess for orthotic

## 2019-05-02 NOTE — PROGRESS NOTES
"Last Name: Sunita LOMBARDO (pe-teet). Chief Complaints during this visit:   f/u visit for PD     History of present illness:   69 y.o. M returns in f/u for PD.  Accompanied by wife.  Has fallen 3-4 times since last seen.    Has been more consistent with his sinemet, but no decrease in falls or change in balance.  Left foot turns in and trips him up.  This has been breaking his leg in 2015, but seems to be getting more frequent.  In PT currently- wife wonders if a brace would help.    Spilling things.    Memory stable.  Vision worsening.    Interval history: 1/29/19:  Not seen in a year.  Had PCI of LAD last fall and partial colectomy a few months prior.  Noticing worse in balance.  Fell 12/5 while going into store, injured right shoulder.  Just got an injection for shoulder which has helped.  Misses mid-day dose of levodopa often.  More clumsy.  Speech more difficult to understand.  Falls asleep a lot, while watching TV.  Difficult sleeping at night.  No visions, hallucinations.  Appetite has increased, gained about 20 lbs.    Interval history 2/1/18:  Balance is worsening.  No falls.  Would like to get PT.  Memory is worsening per patient, but wife says she doesn't feel there's much problem.  Renal insufficiency is now "above 2", so now on low phosphorus diet as of yesterday.  Wife worried as "everything I read about PD says you have to have protein."    Interval history 10/6/17:  Feels like he is doing ok, over-all, just losing a little more balance.  No falls.  Sleep is better.  Speech is getting harder to understand.  Insurer won't cover any more ST this year.  No choking.  Would like to switch to liquid amantadine as free!  Amantadine capsules are $90.    Interval history 7/5/17:  Taking meds regular.  Main complaint is his sleep.  If he sleeps poorly, then he is spacey the next day.  Balance is off, but no falls.    Interval history 4/6/17:  He states that overall, he is feeling fair. Tremor is not bad but " "balance seems to be doing worse. Went to physical therapy this morning. Has order to continue for another 8 weeks.   Memory is not doing to good. Having difficulty remembering peoples names and addresses.   Having some trouble sleeping too. Hard to fall asleep.   Dropping things and having touble buttoning buttons   Gets an occasional scary look in his eyes.  He and his wife state they never got the results of his swallowing study  Still having persistent tongue protrusion.      Interval history 1/4/17:  No complaints.  His main complaint is fatigue and also "feeling fatigue" when he gets out in the sun.  No falls since last seen.  Stumbles, but no falls.  Sleeping "oK', though he lays in bed awake for a couple hours.  Sleeps better in easy chair.  Gave his guitar away as was too hard to play.  Has more trouble making decisions.  Has cravings for sweets.    Interval history 8/5/16:  Never took the remeron because they didn't want to take more medication.  Tremors come/go.  Weight is back up.  Goes to therapy 3x/week.  If he doesn't he gets too stiff.  Back pain, daily.  Relieved by sitting down.  Easily fatigued.    From my note 4/8/16:  Continues finds the sinemet CR better for him, better control of tremors.  Balance still an issue, but no falls.  Reluctant to use a cane.  Difficult to fall asleep at night.  Active dreaming.  No memory problems and no hallucinations.    Has episodes of staring.    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  Lost some weight.  During month in hospital, though, he was not shaking.  Now they've come back "twice as bad."    Oxycodone made him hallucinate, so stopped after a week (during hospitalization).      II. Review of systems As in HPI, otherwise, balance 3  systems reviewed and are negative.   III. Past Medical history: PD left tremor-type 2009 (Dr. Ramos dx); "CVA" 1998; Right shoulder dyskinesia vs tic since at least 1994; COPD; HTN   Family history: " "brother with tics?   Social history: No tob or etoh, lives with family, , disabled .  Enjoys hunting and fishing.    Current neuro medications: benztropine 0.5mg BID, amantadine 100qam, carbidopa-levodopa 50/200 tid (9am, noon, 9pm)  Allergies: penicillin, mirapex caused compulsive gambling     IV. Physical Exam (Includes Part III of Unified Parkinsons Disease Rating Scale 2008)       Vitals:    05/02/19 1042   BP: (!) 164/93   Pulse: 68   Weight: 70.3 kg (154 lb 15.7 oz)   Height: 5' 9" (1.753 m)     Wt Readings from Last 5 Encounters:   05/02/19 70.3 kg (154 lb 15.7 oz)   03/06/19 69.5 kg (153 lb 3.5 oz)   01/29/19 69.8 kg (153 lb 14.1 oz)   10/26/18 63.6 kg (140 lb 3.4 oz)   10/04/18 65.8 kg (145 lb)     General appearance: Well nourished, well developed, no acute distress   -------------------------------------------------------------   Facial Expression: "poker face" (1)   Affect: full   Orientation to time & place: Oriented to time, place, person and situation   Memory:  Not tested  Speech: Slight loss of expression, diction or tone (1)     UPDRS Motor Examination      Speech  1 - Slight loss of expression, dictation, and/or volumn.   Facial Expression  2 - Slight but definitely abnormal diminution of facial expression.    Rigidity     Neck 1 - Slight or detectable only when activated by mirror or other movements.   Upper Extremity: Right 0 - Absent.   Upper Extremity: Left 1 - Slight or detectable only when activated by mirror or other movements.   Lower Extremity: Right 0 - Absent.   Lower Extremity: Left 0 - Absent.     Finger Taps      right 1 - Mild slowing and/or reduction in amplitude.   left 2 - Moderately impaired. Definite and early fatiguing. May have occasional arrests in movement.   Hand Movements      right 1 - Mild slowing and/or reduction in amplitude.   left 2 - Moderately impaired. Definite and early fatiguing. May have occasional arrests in movement.   Pronation/supination of " Hands      right 1 - Mild slowing and/or reduction in amplitude.   left 2 - Moderately impaired. Definite and early fatiguing. May have occasional arrests in movement.     Toe Tapping    right 1 - Mild slowing and/or reduction in amplitude.   left 2 - Moderately impaired. Definite and early fatiguing. May have occasional arrests in movement.     Leg Agility      right 1 - Mild slowing and/or reduction in amplitude.   left 2 - Moderately impaired. Definite and early fatiguing. May have occasional arrests in movement.   Arising from Chair  2 - Pushes self up from arms of seat.   Posture  1 - Not quite erect, slightly stooped posture; could be normal for older person.   Gait  2 - Walks with difficulty, but requires little or no assistance; may have some festination, short steps, or propulsion.   Freezing of gait 0   Postural Stability (Response to sudden, strong posterior displacement produced by pull on shoulders while patient erect with eyes open and feet slightly apart.   Deferred   Body Bradykinesia and Hypokinesia (Combining slowness, hesitancy, decreased armswing, small amplitude, and poverty of movement in general)  2 - Mild degree of slowness and poverty of movement which is definitely abnormal.     Tremor at Rest:      Face, lips, chin 0 - Absent.    Hands:      right 0 - Absent.    left 2 - Mild in amplitude and persistent. Or moderate in amplitude, but only intermittently present.    Feet:     right 0 - Absent.    left 0 - Absent.    Constancy of REST tremor: 2: Mild: Tremor at rest is present 26-50% of the entire examination period.    Postural tremor:    right 0 - Absent.    left 0 - Absent.    Kinetic tremor:    right 0 - Absent.    left 0 - Absent.    Dyskinesias present? No       Total Motor UPDRS:  29        V. Laboratory/ Radiological Data:     Lab Results   Component Value Date    CREATININE 1.76 (H) 10/04/2018        Lab Results   Component Value Date    LFNGVTSS10 304 04/08/2016     B1 61  spep  normal      VI. Assessment and Plan    Problem List Items Addressed This Visit        1 - High    PD (Parkinson's disease) - Primary    Overview     classic type, left-predominant tremor         Current Assessment & Plan     Tremor is controlled and his gait looks good today (from PD standpoint), so I suspect his falling may be from the orthopedic issues of left foot and proprioceptive loss.   -> no changes in medications            2     Mild cognitive impairment with memory loss    Overview     2/1/18 Kennedy Cognitive Assessment:   23/30         Current Assessment & Plan     Stable per he and wife.            3     Inversion deformity of foot, left    Relevant Orders    Ambulatory Referral to Physical/Occupational Therapy    Balance disorder    Current Assessment & Plan     Mild-moderate balance disorder, predominantly from left foot issues and neuropathy (unknown etiology).  PD is playing a role, albeit, minor.   -> encouraged use of cane   -> order sent to PT to assess for orthotic                 No follow-ups on file.

## 2019-05-02 NOTE — ASSESSMENT & PLAN NOTE
Tremor is controlled and his gait looks good today (from PD standpoint), so I suspect his falling may be from the orthopedic issues of left foot and proprioceptive loss.   -> no changes in medications

## 2019-07-05 DIAGNOSIS — I10 HYPERTENSION, UNSPECIFIED TYPE: ICD-10-CM

## 2019-07-05 RX ORDER — PRAVASTATIN SODIUM 40 MG/1
TABLET ORAL
Qty: 90 TABLET | Refills: 1 | Status: SHIPPED | OUTPATIENT
Start: 2019-07-05 | End: 2019-09-04 | Stop reason: DRUGHIGH

## 2019-07-05 RX ORDER — AMLODIPINE BESYLATE 2.5 MG/1
TABLET ORAL
Qty: 90 TABLET | Refills: 1 | Status: SHIPPED | OUTPATIENT
Start: 2019-07-05 | End: 2020-01-09 | Stop reason: SDUPTHER

## 2019-07-05 RX ORDER — HYDRALAZINE HYDROCHLORIDE 50 MG/1
TABLET, FILM COATED ORAL
Qty: 180 TABLET | Refills: 1 | Status: SHIPPED | OUTPATIENT
Start: 2019-07-05 | End: 2020-01-09 | Stop reason: SDUPTHER

## 2019-07-09 NOTE — TELEPHONE ENCOUNTER
----- Message from Reba Avendano sent at 10/16/2017  2:16 PM CDT -----  Ya with [facility] at [phone] called regarding medication: Amantadine HCl (SYMMETREL) 50 mg/5 mL Soln  for above patient. They are requesting Rx clarification.  reference number [id number]  
Called and tried to leave a message for Ya regarding  and a clarification on a medication. The phone just continued to ring without an answer.  
Awake/Alert

## 2019-08-15 ENCOUNTER — TELEPHONE (OUTPATIENT)
Dept: CARDIOLOGY | Facility: CLINIC | Age: 69
End: 2019-08-15

## 2019-08-22 RX ORDER — CARBIDOPA AND LEVODOPA 50; 200 MG/1; MG/1
TABLET, EXTENDED RELEASE ORAL
Qty: 360 TABLET | Refills: 4 | Status: SHIPPED | OUTPATIENT
Start: 2019-08-22 | End: 2019-09-19 | Stop reason: SDUPTHER

## 2019-09-04 ENCOUNTER — OFFICE VISIT (OUTPATIENT)
Dept: CARDIOLOGY | Facility: CLINIC | Age: 69
End: 2019-09-04
Payer: MEDICARE

## 2019-09-04 VITALS
DIASTOLIC BLOOD PRESSURE: 68 MMHG | OXYGEN SATURATION: 96 % | HEART RATE: 76 BPM | WEIGHT: 155 LBS | SYSTOLIC BLOOD PRESSURE: 122 MMHG | HEIGHT: 69 IN | BODY MASS INDEX: 22.96 KG/M2

## 2019-09-04 DIAGNOSIS — I10 ESSENTIAL HYPERTENSION: ICD-10-CM

## 2019-09-04 DIAGNOSIS — Z79.02 LONG TERM (CURRENT) USE OF ANTITHROMBOTICS/ANTIPLATELETS: ICD-10-CM

## 2019-09-04 DIAGNOSIS — E78.00 HYPERCHOLESTEROLEMIA: ICD-10-CM

## 2019-09-04 DIAGNOSIS — I25.118 CORONARY ARTERY DISEASE OF NATIVE ARTERY OF NATIVE HEART WITH STABLE ANGINA PECTORIS: Primary | ICD-10-CM

## 2019-09-04 PROCEDURE — 99214 PR OFFICE/OUTPT VISIT, EST, LEVL IV, 30-39 MIN: ICD-10-PCS | Mod: S$GLB,,, | Performed by: INTERNAL MEDICINE

## 2019-09-04 PROCEDURE — 99214 OFFICE O/P EST MOD 30 MIN: CPT | Mod: S$GLB,,, | Performed by: INTERNAL MEDICINE

## 2019-09-04 RX ORDER — PRAVASTATIN SODIUM 80 MG/1
80 TABLET ORAL NIGHTLY
Qty: 90 TABLET | Refills: 1 | Status: SHIPPED | OUTPATIENT
Start: 2019-09-04 | End: 2020-01-09 | Stop reason: SDUPTHER

## 2019-09-04 NOTE — PROGRESS NOTES
Subjective:    Patient ID:  Yannick Dorsey is a 69 y.o. male who presents for Coronary Artery Disease (labs); Hypertension; and Hyperlipidemia        HPI  DISCUSSED LABS AND GOALS, LABS AT PCP, LDL 81, HDL 45, CBC OK, CR 2.18, DOING OK, WORSEN ING GAIT WITH PARKISON'S, IN THE DONUT HOLE FOR BRILINTA, NO CHEST PAIN, MILD DYSPNEA ON EXERTION, NO TIA TYPE SYMPTOMS, NO NEAR-SYNCOPE, WORSENING PARKINSON'S SYMPTOMS, SEE ROS    Past Medical History:   Diagnosis Date    Adenoma of cecum 7/24/2018    Broken fibula 2/23/2015    left calf    Broken tibia 2/23/2015    left calve    Cancer     SKIN    Compulsive gambling 7/31/2012    COPD (chronic obstructive pulmonary disease)     Coronary artery disease     Heart murmur     HTN (hypertension)     Hyperlipidemia     Kidney disease, chronic, stage III (GFR 30-59 ml/min)     PD (Parkinson's disease) 2009    left tremor-dominant    Polyp of colon     Stroke 1998    Tic 1994    right shoulder     Past Surgical History:   Procedure Laterality Date    Angiogram Renal Bilateral Selective N/A 9/27/2018    [facility] CATH    Angiogram, Abdomen N/A 9/27/2018    Performed by [facility] CATH    CARDIAC CATHETERIZATION      COLECTOMY Right 7/24/2018    [facility]ad Coles [facility] at [facility] OR    COLONOSCOPY N/A 6/1/2018    Performed by Ross Leyva [facility] at [facility] ENDO    INSERTION, STENT, CORONARY ARTERY N/A 10/4/2018    Per[facility] by [facility] CATH    JOINT REPLACEMENT Bilateral     knees    Left heart cath Left 10/4/2018    Per[facility] by [facility] CATH    Left heart cath N/A 9/27/2018    Per[facility] by [facility] CATH    LEG SURGERY Left     PARATHYROIDECTOMY      PERCUTANEOUS CORONARY INTERVENTION, ARTERY N/A 10/4/2018    Per[facility] CATH    PROSTATE SURGERY      XI ROBOTIC COLECTOMY Right 7/24/2018    [facility] at [facility]  OR     Family History   Problem Relation Age of Onset    Tics Brother     Cancer Brother     Cancer Mother     COPD Mother     Stroke Father      Social History     Socioeconomic History    Marital status:      Spouse name: Not on file    Number of children: Not on file    Years of education: Not on file    Highest education level: Not on file   Occupational History    Not on file   Social Needs    Financial resource strain: Not on file    Food insecurity:     Worry: Not on file     Inability: Not on file    Transportation needs:     Medical: Not on file     Non-medical: Not on file   Tobacco Use    Smoking status: Never Smoker    Smokeless tobacco: Never Used   Substance and Sexual Activity    Alcohol use: No    Drug use: No    Sexual activity: Not on file   Lifestyle    Physical activity:     Days per week: Not on file     Minutes per session: Not on file    Stress: Not on file   Relationships    Social connections:     Talks on phone: Not on file     Gets together: Not on file     Attends Baptism service: Not on file     Active member of club or organization: Not on file     Attends meetings of clubs or organizations: Not on file     Relationship status: Not on file   Other Topics Concern    Not on file   Social History Narrative    Not on file       Review of patient's allergies indicates:   Allergen Reactions    Plavix [clopidogrel] Rash    Penicillins      Other reaction(s): Unknown    Pramipexole Other (See Comments)     Compulsive gambling at 0.5mg tid       Current Outpatient Medications:     amantadine HCl (SYMMETREL) 50 mg/5 mL Soln, TAKE 10 ML (100MG) ONCE A  DAY, Disp: 900 mL, Rfl: 4    amLODIPine (NORVASC) 2.5 MG tablet, TAKE 1 TABLET BY MOUTH  EVERY EVENING, Disp: 90 tablet, Rfl: 1    aspirin (ECOTRIN) 81 MG EC tablet, Take 81 mg by mouth once daily., Disp: , Rfl:     benztropine (COGENTIN) 0.5 MG tablet, TAKE 1 TABLET BY MOUTH TWO  TIMES DAILY, Disp: 180 tablet,  Rfl: 3    carbidopa-levodopa  mg (SINEMET CR)  mg TbSR, TAKE 1 TABLET BY MOUTH 4  TIMES DAILY, Disp: 360 tablet, Rfl: 4    cloNIDine (CATAPRES) 0.1 MG tablet, Take 0.1 mg by mouth daily as needed. , Disp: , Rfl:     cyanocobalamin (VITAMIN B-12) 100 MCG tablet, Take 1 tablet (100 mcg total) by mouth once daily., Disp: 30 tablet, Rfl: 11    ferrous sulfate 324 mg (65 mg iron) TbEC, Take 325 mg by mouth once daily., Disp: , Rfl:     hydrALAZINE (APRESOLINE) 50 MG tablet, TAKE 1 TABLET BY MOUTH TWO  TIMES DAILY, Disp: 180 tablet, Rfl: 1    nitroGLYCERIN (NITROSTAT) 0.4 MG SL tablet, Place 1 tablet (0.4 mg total) under the tongue every 5 (five) minutes as needed., Disp: 25 tablet, Rfl: 0    PROAIR HFA 90 mcg/actuation inhaler, Inhale 2 puffs into the lungs 4 times daily as needed. , Disp: , Rfl:     SYMBICORT 80-4.5 mcg/actuation HFAA, Inhale 2 puffs into the lungs 2 (two) times daily. , Disp: , Rfl:     vitamin D 1000 units Tab, Take 1,000 Units by mouth once daily., Disp: , Rfl:     albuterol (PROVENTIL) 5 mg/mL nebulizer solution, Take 2.5 mg by nebulization every 6 (six) hours as needed for Wheezing. Rescue, Disp: , Rfl:     pravastatin (PRAVACHOL) 80 MG tablet, Take 1 tablet (80 mg total) by mouth every evening., Disp: 90 tablet, Rfl: 1    ticagrelor (BRILINTA) 90 mg tablet, Take 1 tablet (90 mg total) by mouth 2 (two) times daily., Disp: 60 tablet, Rfl: 3    Review of Systems   Constitution: Negative for chills, diaphoresis, fever, malaise/fatigue and night sweats.   HENT: Negative for congestion and nosebleeds (SOME).    Eyes: Negative for blurred vision, double vision and visual disturbance.   Cardiovascular: Negative for chest pain, claudication, cyanosis, dyspnea on exertion (MILD, BETTER), irregular heartbeat, leg swelling (OCC), near-syncope, orthopnea, palpitations, paroxysmal nocturnal dyspnea and syncope.   Respiratory: Negative for cough (CHRONIC), hemoptysis, shortness of breath  "and wheezing (OCC).    Endocrine: Negative for cold intolerance, heat intolerance and polyuria.   Hematologic/Lymphatic: Negative for adenopathy and bleeding problem. Does not bruise/bleed easily.   Skin: Negative for color change, itching and rash.   Musculoskeletal: Negative for back pain, falls and joint pain.   Gastrointestinal: Negative for abdominal pain, change in bowel habit, dysphagia, heartburn, hematemesis, jaundice, melena and vomiting.   Genitourinary: Negative for dysuria, flank pain and frequency.   Neurological: Negative for brief paralysis, dizziness, focal weakness, light-headedness, loss of balance, tremors (PARKINSON'S) and weakness.   Psychiatric/Behavioral: Negative for altered mental status, depression and memory loss.   Allergic/Immunologic: Negative for hives and persistent infections.        Objective:      Vitals:    09/04/19 0821   BP: 122/68   Pulse: 76   SpO2: 96%   Weight: 70.3 kg (154 lb 15.7 oz)   Height: 5' 9" (1.753 m)   PainSc: 0-No pain     Body mass index is 22.89 kg/m².    Physical Exam   Constitutional: He is oriented to person, place, and time. He appears well-developed and well-nourished. He is active.   HENT:   Head: Normocephalic and atraumatic.   Mouth/Throat: Oropharynx is clear and moist and mucous membranes are normal.   Eyes: Pupils are equal, round, and reactive to light. Conjunctivae and EOM are normal.   Neck: Normal range of motion. Neck supple. Normal carotid pulses, no hepatojugular reflux and no JVD present. Carotid bruit is not present. No tracheal deviation, no edema and no erythema present. No thyromegaly present.   Cardiovascular: Normal rate and regular rhythm.  No extrasystoles are present. PMI is not displaced. Exam reveals no gallop, no distant heart sounds, no friction rub and no midsystolic click.   Murmur heard.  High-pitched holosystolic murmur is present with a grade of 2/6 at the apex.  Pulses:       Carotid pulses are 2+ on the right side, and 2+ " on the left side.       Radial pulses are 2+ on the right side, and 2+ on the left side.        Femoral pulses are 2+ on the right side, and 2+ on the left side.       Dorsalis pedis pulses are 2+ on the right side, and 2+ on the left side.        Posterior tibial pulses are 2+ on the right side, and 2+ on the left side.   Pulmonary/Chest: Effort normal and breath sounds normal. No accessory muscle usage. No tachypnea and no bradypnea. No respiratory distress.   Abdominal: Soft. Bowel sounds are normal. He exhibits no distension and no mass. There is no hepatosplenomegaly. There is no tenderness. There is no CVA tenderness.   Musculoskeletal: Normal range of motion. He exhibits no edema or deformity.   Lymphadenopathy:     He has no cervical adenopathy.   Neurological: He is alert and oriented to person, place, and time. He has normal strength. He displays tremor. No cranial nerve deficit.   SLIGHTLY SLOW GAIT   Skin: Skin is warm and dry. No cyanosis or erythema. No pallor.   Psychiatric: He has a normal mood and affect. His speech is normal and behavior is normal.               ..    Chemistry        Component Value Date/Time     10/04/2018 0627    K 3.6 10/04/2018 0627     10/04/2018 0627    CO2 23 10/04/2018 0627    BUN 31 (H) 10/04/2018 0627    CREATININE 1.76 (H) 10/04/2018 0627    GLU 98 10/04/2018 0627        Component Value Date/Time    CALCIUM 9.5 10/04/2018 0627    ALKPHOS 109 04/08/2016 1600    AST 17 04/08/2016 1600    ALT <5 (L) 04/08/2016 1600    BILITOT 0.3 04/08/2016 1600    ESTGFRAFRICA 46 (A) 10/04/2018 0627    EGFRNONAA 39 (A) 10/04/2018 0627            ..No results found for: CHOL  No results found for: HDL  No results found for: LDLCALC  No results found for: TRIG  No results found for: CHOLHDL  ..  Lab Results   Component Value Date    WBC 6.14 10/04/2018    HGB 13.5 (L) 10/04/2018    HCT 40.7 10/04/2018    MCV 92 10/04/2018     10/04/2018       Test(s) Reviewed  I have  reviewed the following in detail:  [] Stress test   [] Angiography   [] Echocardiogram   [x] Labs   [] Other:       Assessment:         ICD-10-CM ICD-9-CM   1. Coronary artery disease of native artery of native heart with stable angina pectoris I25.118 414.01     413.9   2. Long term (current) use of antithrombotics/antiplatelets Z79.02 V58.63   3. Essential hypertension I10 401.9   4. Hypercholesterolemia E78.00 272.0     Problem List Items Addressed This Visit        Cardiac/Vascular    Hypercholesterolemia    Relevant Orders    Comprehensive metabolic panel    Lipid panel    Essential hypertension    Relevant Orders    Comprehensive metabolic panel    Coronary artery disease of native artery of native heart with stable angina pectoris - Primary    Relevant Orders    Comprehensive metabolic panel       Hematology    Long term (current) use of antithrombotics/antiplatelets    Relevant Orders    Comprehensive metabolic panel           Plan:     ASSISTANCE PROGRAM WITH BRILINTA, NO ALTERNATIVE SINCE THE PATIENT HAD RASH WITH PLAVIX, H/O CVA 98, SO NOT A CANDIDATE FOR EFFIENT, INCREASE PRAVACHOL TO 80 MG, TARGET LDL LOWER,ALL CV CLINICALLY STABLE, CLASS 0-1 ANGINA, NO HF, NO TIA, NO CLINICAL ARRHYTHMIA,CONTINUE CURRENT MEDS, EDUCATION, DIET, EXERCISE, RETURN TO CLINIC IN 6 MO WITH LABS, EXPLAINED PLAN TO THE PATIENT AND HIS WIFE      Coronary artery disease of native artery of native heart with stable angina pectoris  -     Comprehensive metabolic panel; Future; Expected date: 09/04/2019    Long term (current) use of antithrombotics/antiplatelets  -     Comprehensive metabolic panel; Future; Expected date: 09/04/2019    Essential hypertension  -     Comprehensive metabolic panel; Future; Expected date: 09/04/2019    Hypercholesterolemia  -     Comprehensive metabolic panel; Future; Expected date: 09/04/2019  -     Lipid panel; Future; Expected date: 09/04/2019    Other orders  -     ticagrelor (BRILINTA) 90 mg tablet;  Take 1 tablet (90 mg total) by mouth 2 (two) times daily.  Dispense: 60 tablet; Refill: 3  -     pravastatin (PRAVACHOL) 80 MG tablet; Take 1 tablet (80 mg total) by mouth every evening.  Dispense: 90 tablet; Refill: 1    RTC Low level/low impact aerobic exercise 5x's/wk. Heart healthy diet and risk factor modification.    See labs and med orders.    Aerobic exercise 5x's/wk. Heart healthy diet and risk factor modification.    See labs and med orders.

## 2019-09-06 ENCOUNTER — TELEPHONE (OUTPATIENT)
Dept: PHARMACY | Facility: AMBULARY SURGERY CENTER | Age: 69
End: 2019-09-06

## 2019-09-19 ENCOUNTER — OFFICE VISIT (OUTPATIENT)
Dept: NEUROLOGY | Facility: CLINIC | Age: 69
End: 2019-09-19
Payer: MEDICARE

## 2019-09-19 VITALS
BODY MASS INDEX: 20.59 KG/M2 | DIASTOLIC BLOOD PRESSURE: 91 MMHG | HEIGHT: 69 IN | HEART RATE: 75 BPM | SYSTOLIC BLOOD PRESSURE: 149 MMHG | WEIGHT: 139 LBS

## 2019-09-19 DIAGNOSIS — N18.30 STAGE 3 CHRONIC KIDNEY DISEASE: ICD-10-CM

## 2019-09-19 DIAGNOSIS — G20.A1 PD (PARKINSON'S DISEASE): ICD-10-CM

## 2019-09-19 DIAGNOSIS — G31.84 MILD COGNITIVE IMPAIRMENT WITH MEMORY LOSS: Primary | ICD-10-CM

## 2019-09-19 PROCEDURE — 99999 PR PBB SHADOW E&M-EST. PATIENT-LVL III: ICD-10-PCS | Mod: PBBFAC,,, | Performed by: PSYCHIATRY & NEUROLOGY

## 2019-09-19 PROCEDURE — 99214 OFFICE O/P EST MOD 30 MIN: CPT | Mod: S$PBB,,, | Performed by: PSYCHIATRY & NEUROLOGY

## 2019-09-19 PROCEDURE — 99213 OFFICE O/P EST LOW 20 MIN: CPT | Mod: PBBFAC,PO | Performed by: PSYCHIATRY & NEUROLOGY

## 2019-09-19 PROCEDURE — 99999 PR PBB SHADOW E&M-EST. PATIENT-LVL III: CPT | Mod: PBBFAC,,, | Performed by: PSYCHIATRY & NEUROLOGY

## 2019-09-19 PROCEDURE — 99214 PR OFFICE/OUTPT VISIT, EST, LEVL IV, 30-39 MIN: ICD-10-PCS | Mod: S$PBB,,, | Performed by: PSYCHIATRY & NEUROLOGY

## 2019-09-19 RX ORDER — CARBIDOPA AND LEVODOPA 50; 200 MG/1; MG/1
1 TABLET, EXTENDED RELEASE ORAL 3 TIMES DAILY
Qty: 270 TABLET | Refills: 3 | Status: SHIPPED | OUTPATIENT
Start: 2019-09-19 | End: 2020-05-12 | Stop reason: SDUPTHER

## 2019-09-19 RX ORDER — AMANTADINE HYDROCHLORIDE 50 MG/5ML
SOLUTION ORAL
Qty: 900 ML | Refills: 4 | Status: SHIPPED | OUTPATIENT
Start: 2019-09-19 | End: 2020-11-11 | Stop reason: SDUPTHER

## 2019-09-19 RX ORDER — BENZTROPINE MESYLATE 0.5 MG/1
0.5 TABLET ORAL 2 TIMES DAILY
Qty: 180 TABLET | Refills: 3 | Status: CANCELLED | OUTPATIENT
Start: 2019-09-19

## 2019-09-19 NOTE — ASSESSMENT & PLAN NOTE
Physically doing well.  He reports exquisite response to amantadine (if misses dose, tremors more obvious).    We showed a 20lb weight loss today, but he and wife don't believe it could be accurate.   -> refill meds

## 2019-09-19 NOTE — PATIENT INSTRUCTIONS
STOP taking BENZTROPINE.      I'm hoping your memory and speed of thought IMPROVE off of this medication.  If not, just let me know and we can restart it for the tremor.  I suspect you will see a difference (good or bad) within a week.

## 2019-09-19 NOTE — ASSESSMENT & PLAN NOTE
"Not as "quick" with thoughts as in past.  "starry eyed" at times.  Very good at chess, though.   -> stop benztropine   "

## 2019-09-19 NOTE — PROGRESS NOTES
"Last Name: Sunita LOMBARDO (pe-teet). Chief Complaints during this visit:   f/u visit for PD. Accompanied by wife.    History of present illness:   69 y.o. M returns in f/u for PD.  1 fall since May.  Balance and speech are issues. "bites his tongue."  Got new glasses but vision still blurry.  PT 1-2x per week, pays OOP.  Wife notices confusion at times. "starry eyed"  History taken and documented by Sita David RN.     05/02/19. Accompanied by wife.  Has fallen 3-4 times since last seen.    Has been more consistent with his sinemet, but no decrease in falls or change in balance.  Left foot turns in and trips him up.  This has been breaking his leg in 2015, but seems to be getting more frequent.  In PT currently- wife wonders if a brace would help.    Spilling things.    Memory stable.  Vision worsening.    Interval history: 1/29/19:  Not seen in a year.  Had PCI of LAD last fall and partial colectomy a few months prior.  Noticing worse in balance.  Fell 12/5 while going into store, injured right shoulder.  Just got an injection for shoulder which has helped.  Misses mid-day dose of levodopa often.  More clumsy.  Speech more difficult to understand.  Falls asleep a lot, while watching TV.  Difficult sleeping at night.  No visions, hallucinations.  Appetite has increased, gained about 20 lbs.    Interval history 2/1/18:  Balance is worsening.  No falls.  Would like to get PT.  Memory is worsening per patient, but wife says she doesn't feel there's much problem.  Renal insufficiency is now "above 2", so now on low phosphorus diet as of yesterday.  Wife worried as "everything I read about PD says you have to have protein."    Interval history 10/6/17:  Feels like he is doing ok, over-all, just losing a little more balance.  No falls.  Sleep is better.  Speech is getting harder to understand.  Insurer won't cover any more ST this year.  No choking.  Would like to switch to liquid amantadine as free!  Amantadine " "capsules are $90.    Interval history 7/5/17:  Taking meds regular.  Main complaint is his sleep.  If he sleeps poorly, then he is spacey the next day.  Balance is off, but no falls.    Interval history 4/6/17:  He states that overall, he is feeling fair. Tremor is not bad but balance seems to be doing worse. Went to physical therapy this morning. Has order to continue for another 8 weeks.   Memory is not doing to good. Having difficulty remembering peoples names and addresses.   Having some trouble sleeping too. Hard to fall asleep.   Dropping things and having touble buttoning buttons   Gets an occasional scary look in his eyes.  He and his wife state they never got the results of his swallowing study  Still having persistent tongue protrusion.      Interval history 1/4/17:  No complaints.  His main complaint is fatigue and also "feeling fatigue" when he gets out in the sun.  No falls since last seen.  Stumbles, but no falls.  Sleeping "oK', though he lays in bed awake for a couple hours.  Sleeps better in easy chair.  Gave his guitar away as was too hard to play.  Has more trouble making decisions.  Has cravings for sweets.    Interval history 8/5/16:  Never took the remeron because they didn't want to take more medication.  Tremors come/go.  Weight is back up.  Goes to therapy 3x/week.  If he doesn't he gets too stiff.  Back pain, daily.  Relieved by sitting down.  Easily fatigued.    From my note 4/8/16:  Continues finds the sinemet CR better for him, better control of tremors.  Balance still an issue, but no falls.  Reluctant to use a cane.  Difficult to fall asleep at night.  Active dreaming.  No memory problems and no hallucinations.    Has episodes of staring.    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  Lost some weight.  During month in hospital, though, he was not shaking.  Now they've come back "twice as bad."    Oxycodone made him hallucinate, so stopped after a week (during " "hospitalization).      II. Review of systems As in HPI, otherwise, balance 3  systems reviewed and are negative.   III. Past Medical history: PD left tremor-type 2009 (Dr. Ramos dx); "CVA" 1998; Right shoulder dyskinesia vs tic since at least 1994; COPD; HTN   Family history: brother with tics?   Social history: No tob or etoh, lives with family, , disabled .  Enjoys hunting and fishing.    Current neuro medications: benztropine 0.5mg BID, amantadine 100qam, carbidopa-levodopa 50/200 tid (9am, noon, 9pm)  Allergies: penicillin, mirapex caused compulsive gambling     IV. Physical Exam (Includes Part III of Unified Parkinsons Disease Rating Scale 2008)       Vitals:    09/19/19 1100   BP: (!) 149/91   Pulse: 75   Weight: 63 kg (139 lb)   Height: 5' 9" (1.753 m)     Wt Readings from Last 5 Encounters:   09/19/19 63 kg (139 lb)   09/04/19 70.3 kg (154 lb 15.7 oz)   05/02/19 70.3 kg (154 lb 15.7 oz)   03/06/19 69.5 kg (153 lb 3.5 oz)   01/29/19 69.8 kg (153 lb 14.1 oz)     General appearance: Well nourished, well developed, no acute distress   -------------------------------------------------------------   Facial Expression: "poker face" (1)   Affect: full   Orientation to time & place: Oriented to time, place, person and situation   Memory:  Not tested  Speech: Slight loss of expression, diction or tone (1)     UPDRS Motor Examination      Speech  1 - Slight loss of expression, dictation, and/or volumn.   Facial Expression  2 - Slight but definitely abnormal diminution of facial expression.    Rigidity     Neck 1 - Slight or detectable only when activated by mirror or other movements.   Upper Extremity: Right 0 - Absent.   Upper Extremity: Left 1 - Slight or detectable only when activated by mirror or other movements.   Lower Extremity: Right 0 - Absent.   Lower Extremity: Left 0 - Absent.     Finger Taps      right 1 - Mild slowing and/or reduction in amplitude.   left 2 - Moderately impaired. Definite and " early fatiguing. May have occasional arrests in movement.   Hand Movements      right 1 - Mild slowing and/or reduction in amplitude.   left 2 - Moderately impaired. Definite and early fatiguing. May have occasional arrests in movement.   Pronation/supination of Hands      right 1 - Mild slowing and/or reduction in amplitude.   left 2 - Moderately impaired. Definite and early fatiguing. May have occasional arrests in movement.     Toe Tapping    right 1 - Mild slowing and/or reduction in amplitude.   left 2 - Moderately impaired. Definite and early fatiguing. May have occasional arrests in movement.     Leg Agility      right 1 - Mild slowing and/or reduction in amplitude.   left 2 - Moderately impaired. Definite and early fatiguing. May have occasional arrests in movement.   Arising from Chair  2 - Pushes self up from arms of seat.   Posture  1 - Not quite erect, slightly stooped posture; could be normal for older person.   Gait  2 - Walks with difficulty, but requires little or no assistance; may have some festination, short steps, or propulsion.   Freezing of gait 0   Postural Stability (Response to sudden, strong posterior displacement produced by pull on shoulders while patient erect with eyes open and feet slightly apart.   Deferred   Body Bradykinesia and Hypokinesia (Combining slowness, hesitancy, decreased armswing, small amplitude, and poverty of movement in general)  2 - Mild degree of slowness and poverty of movement which is definitely abnormal.     Tremor at Rest:      Face, lips, chin 0 - Absent.    Hands:      right 0 - Absent.    left 2 - Mild in amplitude and persistent. Or moderate in amplitude, but only intermittently present.    Feet:     right 0 - Absent.    left 0 - Absent.    Constancy of REST tremor: 2: Mild: Tremor at rest is present 26-50% of the entire examination period.    Postural tremor:    right 0 - Absent.    left 0 - Absent.    Kinetic tremor:    right 0 - Absent.    left 0 -  "Absent.    Dyskinesias present? No       Total Motor UPDRS:  29        V. Laboratory/ Radiological Data:     Lab Results   Component Value Date    CREATININE 1.76 (H) 10/04/2018        Lab Results   Component Value Date    [id number] 304 04/08/2016     B1 61  spep normal      VI. Assessment and Plan    Problem List Items Addressed This Visit        1 - High    PD (Parkinson's disease)    Overview     classic type, left-predominant tremor         Current Assessment & Plan     Physically doing well.  He reports exquisite response to amantadine (if misses dose, tremors more obvious).    We showed a 20lb weight loss today, but he and wife don't believe it could be accurate.   -> refill meds         Relevant Medications    amantadine HCl (SYMMETREL) 50 mg/5 mL Soln    carbidopa-levodopa  mg (SINEMET CR)  mg TbSR       2     Mild cognitive impairment with memory loss - Primary    Overview     2/1/18 [facility] Cognitive Assessment:   23/30         Current Assessment & Plan     Not as "quick" with thoughts as in past.  "starry eyed" at times.  Very good at chess, though.   -> stop benztropine             5     Stage 3 chronic kidney disease    Current Assessment & Plan     No increase possible in amantadine.                 Follow up in about 4 months (around 1/19/2020) for PD.  "

## 2019-11-12 ENCOUNTER — TELEPHONE (OUTPATIENT)
Dept: CARDIOLOGY | Facility: CLINIC | Age: 69
End: 2019-11-12

## 2019-11-12 NOTE — TELEPHONE ENCOUNTER
----- Message from Lucrecia Roman sent at 11/12/2019 11:18 AM CST -----  Contact: pt's wife Marissa [phone] or cell [phone]  Patient's wife  Marissa called about the Brilinta 90 mg they are asking if you will be able to help them get the medication.Patient's wife will be in [place] today she will be able to come by and pick it up.

## 2019-11-12 NOTE — TELEPHONE ENCOUNTER
Spoke to patient wife, informed to come into office today & will speak to her. Patient verbalized understanding.

## 2019-11-15 ENCOUNTER — TELEPHONE (OUTPATIENT)
Dept: CARDIOLOGY | Facility: CLINIC | Age: 69
End: 2019-11-15

## 2019-11-15 NOTE — TELEPHONE ENCOUNTER
----- Message from Brea Woodymichelle sent at 11/15/2019  2:05 PM CST -----  Contact: Wife Marissa   Patients wife is asking if the doctor will change the patients Tier 3 medicine Brilenta 90 MG to generic  a Tier 2 clopidogrel 75 MG.  Big difference in the price so please send over to:  [facility] - [address]  [address]  [facility] [address]  Phone: [phone] Fax: [phone]    Please call back at [phone] to advise and Thank you!

## 2020-01-09 ENCOUNTER — OFFICE VISIT (OUTPATIENT)
Dept: NEUROLOGY | Facility: CLINIC | Age: 70
End: 2020-01-09
Payer: MEDICARE

## 2020-01-09 VITALS
WEIGHT: 139.88 LBS | RESPIRATION RATE: 20 BRPM | HEIGHT: 69 IN | BODY MASS INDEX: 20.72 KG/M2 | HEART RATE: 70 BPM | DIASTOLIC BLOOD PRESSURE: 77 MMHG | SYSTOLIC BLOOD PRESSURE: 117 MMHG

## 2020-01-09 DIAGNOSIS — G31.84 MILD COGNITIVE IMPAIRMENT WITH MEMORY LOSS: ICD-10-CM

## 2020-01-09 DIAGNOSIS — Z95.5 STENTED CORONARY ARTERY: ICD-10-CM

## 2020-01-09 DIAGNOSIS — G20.A1 PD (PARKINSON'S DISEASE): Primary | ICD-10-CM

## 2020-01-09 DIAGNOSIS — I10 HYPERTENSION, UNSPECIFIED TYPE: ICD-10-CM

## 2020-01-09 DIAGNOSIS — E78.00 HYPERCHOLESTEROLEMIA: Primary | ICD-10-CM

## 2020-01-09 DIAGNOSIS — M54.50 CHRONIC MIDLINE LOW BACK PAIN WITHOUT SCIATICA: ICD-10-CM

## 2020-01-09 DIAGNOSIS — I25.118 CORONARY ARTERY DISEASE OF NATIVE ARTERY OF NATIVE HEART WITH STABLE ANGINA PECTORIS: ICD-10-CM

## 2020-01-09 DIAGNOSIS — G89.29 CHRONIC MIDLINE LOW BACK PAIN WITHOUT SCIATICA: ICD-10-CM

## 2020-01-09 DIAGNOSIS — N18.30 STAGE 3 CHRONIC KIDNEY DISEASE: ICD-10-CM

## 2020-01-09 PROCEDURE — 99999 PR PBB SHADOW E&M-EST. PATIENT-LVL III: ICD-10-PCS | Mod: PBBFAC,,, | Performed by: PSYCHIATRY & NEUROLOGY

## 2020-01-09 PROCEDURE — 99213 OFFICE O/P EST LOW 20 MIN: CPT | Mod: PBBFAC,PO | Performed by: PSYCHIATRY & NEUROLOGY

## 2020-01-09 PROCEDURE — 1126F AMNT PAIN NOTED NONE PRSNT: CPT | Mod: ,,, | Performed by: PSYCHIATRY & NEUROLOGY

## 2020-01-09 PROCEDURE — 99999 PR PBB SHADOW E&M-EST. PATIENT-LVL III: CPT | Mod: PBBFAC,,, | Performed by: PSYCHIATRY & NEUROLOGY

## 2020-01-09 PROCEDURE — 1159F PR MEDICATION LIST DOCUMENTED IN MEDICAL RECORD: ICD-10-PCS | Mod: ,,, | Performed by: PSYCHIATRY & NEUROLOGY

## 2020-01-09 PROCEDURE — 1126F PR PAIN SEVERITY QUANTIFIED, NO PAIN PRESENT: ICD-10-PCS | Mod: ,,, | Performed by: PSYCHIATRY & NEUROLOGY

## 2020-01-09 PROCEDURE — 99214 OFFICE O/P EST MOD 30 MIN: CPT | Mod: S$PBB,,, | Performed by: PSYCHIATRY & NEUROLOGY

## 2020-01-09 PROCEDURE — 1159F MED LIST DOCD IN RCRD: CPT | Mod: ,,, | Performed by: PSYCHIATRY & NEUROLOGY

## 2020-01-09 PROCEDURE — 99214 PR OFFICE/OUTPT VISIT, EST, LEVL IV, 30-39 MIN: ICD-10-PCS | Mod: S$PBB,,, | Performed by: PSYCHIATRY & NEUROLOGY

## 2020-01-09 RX ORDER — HYDRALAZINE HYDROCHLORIDE 50 MG/1
50 TABLET, FILM COATED ORAL 2 TIMES DAILY
Qty: 180 TABLET | Refills: 1 | Status: SHIPPED | OUTPATIENT
Start: 2020-01-09 | End: 2020-08-06

## 2020-01-09 RX ORDER — PRAVASTATIN SODIUM 80 MG/1
80 TABLET ORAL NIGHTLY
Qty: 90 TABLET | Refills: 0 | Status: SHIPPED | OUTPATIENT
Start: 2020-01-09 | End: 2020-03-26

## 2020-01-09 RX ORDER — AMLODIPINE BESYLATE 2.5 MG/1
2.5 TABLET ORAL NIGHTLY
Qty: 90 TABLET | Refills: 0 | Status: SHIPPED | OUTPATIENT
Start: 2020-01-09 | End: 2020-03-26

## 2020-01-09 NOTE — ASSESSMENT & PLAN NOTE
Worsening balance.  He reports exquisite response to amantadine (if misses dose, tremors more obvious).   -> PT   -> lumbar brace

## 2020-01-09 NOTE — PROGRESS NOTES
"Last Name: Sunita LOMBARDO (pe-teet). Chief Complaints during this visit:   f/u visit for PD. Accompanied by wife.    History of present illness:   70 y.o. M returns in f/u for PD.  Worsening balance, but no falls since last visit.  Burning in lower back that resolves with sitting down.  A brace has helped (wife's).  Wife with new medical problems (did not disclose) that is causing both significant worry.      9/19/19  1 fall since May.  Balance and speech are issues. "bites his tongue."  Got new glasses but vision still blurry.  PT 1-2x per week, pays OOP.  Wife notices confusion at times. "starry eyed"  History taken and documented by Sita David RN.     05/02/19. Accompanied by wife.  Has fallen 3-4 times since last seen.  Has been more consistent with his sinemet, but no decrease in falls or change in balance.  Left foot turns in and trips him up.  This has been breaking his leg in 2015, but seems to be getting more frequent.  In PT currently- wife wonders if a brace would help.  Spilling things.  Memory stable.  Vision worsening.    Interval history: 1/29/19:  Not seen in a year.  Had PCI of LAD last fall and partial colectomy a few months prior.  Noticing worse in balance.  Fell 12/5 while going into store, injured right shoulder.  Just got an injection for shoulder which has helped.  Misses mid-day dose of levodopa often.  More clumsy.  Speech more difficult to understand.  Falls asleep a lot, while watching TV.  Difficult sleeping at night.  No visions, hallucinations.  Appetite has increased, gained about 20 lbs.    Interval history 2/1/18:  Balance is worsening.  No falls.  Would like to get PT.  Memory is worsening per patient, but wife says she doesn't feel there's much problem.  Renal insufficiency is now "above 2", so now on low phosphorus diet as of yesterday.  Wife worried as "everything I read about PD says you have to have protein."    Interval history 10/6/17:  Feels like he is doing ok, " "over-all, just losing a little more balance.  No falls.  Sleep is better.  Speech is getting harder to understand.  Insurer won't cover any more ST this year.  No choking.  Would like to switch to liquid amantadine as free!  Amantadine capsules are $90.    Interval history 7/5/17:  Taking meds regular.  Main complaint is his sleep.  If he sleeps poorly, then he is spacey the next day.  Balance is off, but no falls.    Interval history 4/6/17:  He states that overall, he is feeling fair. Tremor is not bad but balance seems to be doing worse. Went to physical therapy this morning. Has order to continue for another 8 weeks.   Memory is not doing to good. Having difficulty remembering peoples names and addresses.   Having some trouble sleeping too. Hard to fall asleep.   Dropping things and having touble buttoning buttons   Gets an occasional scary look in his eyes.  He and his wife state they never got the results of his swallowing study  Still having persistent tongue protrusion.      Interval history 1/4/17:  No complaints.  His main complaint is fatigue and also "feeling fatigue" when he gets out in the sun.  No falls since last seen.  Stumbles, but no falls.  Sleeping "oK', though he lays in bed awake for a couple hours.  Sleeps better in easy chair.  Gave his guitar away as was too hard to play.  Has more trouble making decisions.  Has cravings for sweets.    Interval history 8/5/16:  Never took the remeron because they didn't want to take more medication.  Tremors come/go.  Weight is back up.  Goes to therapy 3x/week.  If he doesn't he gets too stiff.  Back pain, daily.  Relieved by sitting down.  Easily fatigued.    From my note 4/8/16:  Continues finds the sinemet CR better for him, better control of tremors.  Balance still an issue, but no falls.  Reluctant to use a cane.  Difficult to fall asleep at night.  Active dreaming.  No memory problems and no hallucinations.    Has episodes of staring.    From my note " "5/6/15:  Broke left leg 2/23, was hospitalized 28 days.  Lost some weight.  During month in hospital, though, he was not shaking.  Now they've come back "twice as bad."    Oxycodone made him hallucinate, so stopped after a week (during hospitalization).      II. Review of systems As in HPI, otherwise, balance 3  systems reviewed and are negative.   III. Past Medical history: PD left tremor-type 2009 (Dr. Ramos dx); "CVA" 1998; Right shoulder dyskinesia vs tic since at least 1994; COPD; HTN   Family history: brother with tics?   Social history: No tob or etoh, lives with family, , disabled .  Enjoys hunting and fishing.    Current neuro medications: benztropine 0.5mg BID, amantadine 100qam, carbidopa-levodopa 50/200 tid (9am, noon, 9pm)  Allergies: penicillin, mirapex caused compulsive gambling     IV. Physical Exam (Includes Part III of Unified Parkinsons Disease Rating Scale 2008)       Vitals:    01/09/20 1408   BP: 117/77   BP Location: Left arm   Patient Position: Sitting   BP Method: Medium (Automatic)   Pulse: 70   Resp: 20   Weight: 63.4 kg (139 lb 13.8 oz)   Height: 5' 9" (1.753 m)     Wt Readings from Last 5 Encounters:   01/09/20 63.4 kg (139 lb 13.8 oz)   09/19/19 63 kg (139 lb)   09/04/19 70.3 kg (154 lb 15.7 oz)   05/02/19 70.3 kg (154 lb 15.7 oz)   03/06/19 69.5 kg (153 lb 3.5 oz)     General appearance: Well nourished, well developed, no acute distress   -------------------------------------------------------------   Facial Expression: "poker face" (1)   Affect: full   Orientation to time & place: Oriented to time, place, person and situation   Memory:  Not tested  Speech: Slight loss of expression, diction or tone (1)     UPDRS Motor Examination      Speech  1 - Slight loss of expression, dictation, and/or volumn.   Facial Expression  2 - Slight but definitely abnormal diminution of facial expression.    Rigidity     Neck 1 - Slight or detectable only when activated by mirror or other " movements.   Upper Extremity: Right 0 - Absent.   Upper Extremity: Left 1 - Slight or detectable only when activated by mirror or other movements.   Lower Extremity: Right 0 - Absent.   Lower Extremity: Left 0 - Absent.     Finger Taps      right 1 - Mild slowing and/or reduction in amplitude.   left 2 - Moderately impaired. Definite and early fatiguing. May have occasional arrests in movement.   Hand Movements      right 1 - Mild slowing and/or reduction in amplitude.   left 2 - Moderately impaired. Definite and early fatiguing. May have occasional arrests in movement.   Pronation/supination of Hands      right 1 - Mild slowing and/or reduction in amplitude.   left 2 - Moderately impaired. Definite and early fatiguing. May have occasional arrests in movement.     Toe Tapping    right 1 - Mild slowing and/or reduction in amplitude.   left 2 - Moderately impaired. Definite and early fatiguing. May have occasional arrests in movement.     Leg Agility      right 1 - Mild slowing and/or reduction in amplitude.   left 2 - Moderately impaired. Definite and early fatiguing. May have occasional arrests in movement.   Arising from Chair  2 - Pushes self up from arms of seat.   Posture  1 - Not quite erect, slightly stooped posture; could be normal for older person.   Gait  2 - Walks with difficulty, but requires little or no assistance; may have some festination, short steps, or propulsion.   Freezing of gait 0   Postural Stability (Response to sudden, strong posterior displacement produced by pull on shoulders while patient erect with eyes open and feet slightly apart.   Deferred   Body Bradykinesia and Hypokinesia (Combining slowness, hesitancy, decreased armswing, small amplitude, and poverty of movement in general)  2 - Mild degree of slowness and poverty of movement which is definitely abnormal.     Tremor at Rest:      Face, lips, chin 0 - Absent.    Hands:      right 0 - Absent.    left 2 - Mild in amplitude and  persistent. Or moderate in amplitude, but only intermittently present.    Feet:     right 0 - Absent.    left 0 - Absent.    Constancy of REST tremor: 2: Mild: Tremor at rest is present 26-50% of the entire examination period.    Postural tremor:    right 0 - Absent.    left 0 - Absent.    Kinetic tremor:    right 0 - Absent.    left 0 - Absent.    Dyskinesias present? No       Total Motor UPDRS:  29        V. Laboratory/ Radiological Data:     Lab Results   Component Value Date    CREATININE 1.76 (H) 10/04/2018        Lab Results   Component Value Date    [id number] 304 04/08/2016     B1 61  spep normal      VI. Assessment and Plan    Problem List Items Addressed This Visit        1 - High    PD (Parkinson's disease) - Primary    Overview     classic type, left-predominant tremor         Current Assessment & Plan     Worsening balance.  He reports exquisite response to amantadine (if misses dose, tremors more obvious).   -> PT   -> lumbar brace         Relevant Orders    Ambulatory Referral to Physical/Occupational Therapy       2     Mild cognitive impairment with memory loss    Overview     2/1/18 [facility] Cognitive Assessment:   23/30            3     Chronic low back pain    Relevant Orders    Back/Cervical Brace For Home Use       5     Stage 3 chronic kidney disease    Current Assessment & Plan     No increase possible in amantadine.                 Follow up in about 4 months (around 5/9/2020).

## 2020-02-20 ENCOUNTER — TELEPHONE (OUTPATIENT)
Dept: NEUROLOGY | Facility: CLINIC | Age: 70
End: 2020-02-20

## 2020-03-04 RX ORDER — BENZTROPINE MESYLATE 0.5 MG/1
TABLET ORAL
Qty: 180 TABLET | Refills: 4 | Status: SHIPPED | OUTPATIENT
Start: 2020-03-04 | End: 2020-05-12 | Stop reason: SDUPTHER

## 2020-03-18 ENCOUNTER — TELEPHONE (OUTPATIENT)
Dept: NEUROLOGY | Facility: CLINIC | Age: 70
End: 2020-03-18

## 2020-03-19 DIAGNOSIS — Z95.5 STENTED CORONARY ARTERY: ICD-10-CM

## 2020-03-19 DIAGNOSIS — I25.118 CORONARY ARTERY DISEASE OF NATIVE ARTERY OF NATIVE HEART WITH STABLE ANGINA PECTORIS: ICD-10-CM

## 2020-03-25 DIAGNOSIS — I10 HYPERTENSION, UNSPECIFIED TYPE: ICD-10-CM

## 2020-03-25 DIAGNOSIS — E78.00 HYPERCHOLESTEROLEMIA: ICD-10-CM

## 2020-03-26 RX ORDER — PRAVASTATIN SODIUM 80 MG/1
TABLET ORAL
Qty: 90 TABLET | Refills: 0 | Status: SHIPPED | OUTPATIENT
Start: 2020-03-26 | End: 2020-06-03 | Stop reason: SDUPTHER

## 2020-03-26 RX ORDER — AMLODIPINE BESYLATE 2.5 MG/1
TABLET ORAL
Qty: 90 TABLET | Refills: 0 | Status: SHIPPED | OUTPATIENT
Start: 2020-03-26 | End: 2020-12-09 | Stop reason: SDUPTHER

## 2020-04-14 DIAGNOSIS — I25.118 CORONARY ARTERY DISEASE OF NATIVE ARTERY OF NATIVE HEART WITH STABLE ANGINA PECTORIS: ICD-10-CM

## 2020-04-14 DIAGNOSIS — Z95.5 STENTED CORONARY ARTERY: ICD-10-CM

## 2020-04-14 RX ORDER — TICAGRELOR 90 MG/1
TABLET ORAL
Qty: 60 TABLET | Refills: 2 | Status: SHIPPED | OUTPATIENT
Start: 2020-04-14 | End: 2020-06-03 | Stop reason: ALTCHOICE

## 2020-04-30 ENCOUNTER — TELEPHONE (OUTPATIENT)
Dept: NEUROLOGY | Facility: CLINIC | Age: 70
End: 2020-04-30

## 2020-04-30 NOTE — TELEPHONE ENCOUNTER
----- Message from Ashu Navarrete sent at 4/30/2020 10:06 AM CDT -----  Contact: Marissa ( spouse ) @ [phone]  Caller requesting a return call about possibly r/s'ing the 5-7th appt, pls call to discuss next  availability

## 2020-05-05 ENCOUNTER — TELEPHONE (OUTPATIENT)
Dept: NEUROLOGY | Facility: CLINIC | Age: 70
End: 2020-05-05

## 2020-05-05 NOTE — TELEPHONE ENCOUNTER
----- Message from Lauren Thomas sent at 5/5/2020 10:19 AM CDT -----  Contact: [phone]  Pt's wife called in to r/s appt. Please call back

## 2020-05-12 ENCOUNTER — OFFICE VISIT (OUTPATIENT)
Dept: NEUROLOGY | Facility: CLINIC | Age: 70
End: 2020-05-12
Payer: MEDICARE

## 2020-05-12 DIAGNOSIS — M54.50 CHRONIC MIDLINE LOW BACK PAIN WITHOUT SCIATICA: ICD-10-CM

## 2020-05-12 DIAGNOSIS — G20.A1 PD (PARKINSON'S DISEASE): Primary | ICD-10-CM

## 2020-05-12 DIAGNOSIS — I10 ESSENTIAL HYPERTENSION: ICD-10-CM

## 2020-05-12 DIAGNOSIS — N18.30 STAGE 3 CHRONIC KIDNEY DISEASE: ICD-10-CM

## 2020-05-12 DIAGNOSIS — G89.29 CHRONIC MIDLINE LOW BACK PAIN WITHOUT SCIATICA: ICD-10-CM

## 2020-05-12 DIAGNOSIS — I25.10 CAD, MULTIPLE VESSEL: ICD-10-CM

## 2020-05-12 PROCEDURE — G0463 HOSPITAL OUTPT CLINIC VISIT: HCPCS

## 2020-05-12 PROCEDURE — 99441 PR PHYSICIAN TELEPHONE EVALUATION 5-10 MIN: CPT | Mod: 95,,, | Performed by: PSYCHIATRY & NEUROLOGY

## 2020-05-12 PROCEDURE — 99211 OFF/OP EST MAY X REQ PHY/QHP: CPT

## 2020-05-12 PROCEDURE — 99441 PR PHYSICIAN TELEPHONE EVALUATION 5-10 MIN: ICD-10-PCS | Mod: 95,,, | Performed by: PSYCHIATRY & NEUROLOGY

## 2020-05-12 RX ORDER — CARBIDOPA AND LEVODOPA 50; 200 MG/1; MG/1
1 TABLET, EXTENDED RELEASE ORAL 3 TIMES DAILY
Qty: 270 TABLET | Refills: 3 | Status: SHIPPED | OUTPATIENT
Start: 2020-05-12 | End: 2020-11-19 | Stop reason: SDUPTHER

## 2020-05-12 RX ORDER — BENZTROPINE MESYLATE 0.5 MG/1
0.5 TABLET ORAL 2 TIMES DAILY
Qty: 180 TABLET | Refills: 4 | Status: SHIPPED | OUTPATIENT
Start: 2020-05-12 | End: 2021-05-31

## 2020-05-12 NOTE — ASSESSMENT & PLAN NOTE
Htn, cad and ckd, so perhaps has to be on the anti-hypertensives, but sounds as though he gets some very low, symptomatic pressures.   -> advised to check bp bid for a week and share with me and pcp

## 2020-05-12 NOTE — PROGRESS NOTES
Established Patient - Audio Only Telehealth Visit     The patient location is: home  The chief complaint leading to consultation is: pd  Visit type: Virtual visit with audio only (telephone)  Total time spent with patient: 8 minutes       The reason for the audio only service rather than synchronous audio and video virtual visit was related to technical difficulties or patient preference/necessity.     Each patient to whom I provide medical services by telemedicine is:  (1) informed of the relationship between the physician and patient and the respective role of any other health care provider with respect to management of the patient; and (2) notified that they may decline to receive medical services by telemedicine and may withdraw from such care at any time. Patient verbally consented to receive this service via voice-only telephone call.       HPI:   Feeling really tired during day.  Sleeps well at night (at least last couple weeks).    No falls.    Eyesight getting worse.  Especially with standing up.   Low pressures at times and felt weak (rarely checks).    Lower back hurts when he moving around- has to take breaks.    Appetite good.       Assessment and plan:    Parkinson's Disease is stable, though I think he may be getting some presyncope by his description.  Advised to check bp for a week and let his providers know the trend.  If NOT the bp, then may be attributed to his Parkinson's Disease, directly.  Already on amantadine and with his CAD, other stimulants are risky.                        This service was not originating from a related E/M service provided within the previous 7 days nor will  to an E/M service or procedure within the next 24 hours or my soonest available appointment.  Prevailing standard of care was able to be met in this audio-only visit.

## 2020-05-27 ENCOUNTER — TELEPHONE (OUTPATIENT)
Dept: CARDIOLOGY | Facility: CLINIC | Age: 70
End: 2020-05-27

## 2020-05-27 NOTE — TELEPHONE ENCOUNTER
----- Message from Keely Shepherd sent at 5/27/2020  8:46 AM CDT -----  Contact: Marissa Dorsey (Spouse)  Marissa Dorsey (Spouse)states that the Dr needs to send lab orders to [facility] in [place] for pt upcoming appointment 6/3,please..[phone] (home)

## 2020-05-29 ENCOUNTER — TELEPHONE (OUTPATIENT)
Dept: CARDIOLOGY | Facility: CLINIC | Age: 70
End: 2020-05-29

## 2020-05-29 NOTE — TELEPHONE ENCOUNTER
----- Message from Evelyn Braga sent at 5/29/2020  2:07 PM CDT -----  Contact: wife  Type: Needs Medical Advice  Who Called:  wife  Best Call Back Number: [phone]  Additional Information: Wife states patient is going to lab today [facility] and would like orders put in for blood work before next week's apt.  Please call to advise.  Thanks!

## 2020-06-03 ENCOUNTER — OFFICE VISIT (OUTPATIENT)
Dept: CARDIOLOGY | Facility: CLINIC | Age: 70
End: 2020-06-03
Payer: MEDICARE

## 2020-06-03 VITALS
DIASTOLIC BLOOD PRESSURE: 72 MMHG | SYSTOLIC BLOOD PRESSURE: 138 MMHG | HEIGHT: 69 IN | OXYGEN SATURATION: 98 % | HEART RATE: 78 BPM | WEIGHT: 148.81 LBS | BODY MASS INDEX: 22.04 KG/M2

## 2020-06-03 DIAGNOSIS — I10 ESSENTIAL HYPERTENSION: ICD-10-CM

## 2020-06-03 DIAGNOSIS — Z79.02 LONG TERM (CURRENT) USE OF ANTITHROMBOTICS/ANTIPLATELETS: ICD-10-CM

## 2020-06-03 DIAGNOSIS — I25.118 CORONARY ARTERY DISEASE OF NATIVE ARTERY OF NATIVE HEART WITH STABLE ANGINA PECTORIS: Primary | ICD-10-CM

## 2020-06-03 DIAGNOSIS — I34.0 NON-RHEUMATIC MITRAL REGURGITATION: ICD-10-CM

## 2020-06-03 DIAGNOSIS — E78.00 HYPERCHOLESTEROLEMIA: ICD-10-CM

## 2020-06-03 PROCEDURE — 99214 OFFICE O/P EST MOD 30 MIN: CPT | Mod: S$GLB,,, | Performed by: INTERNAL MEDICINE

## 2020-06-03 PROCEDURE — 99214 PR OFFICE/OUTPT VISIT, EST, LEVL IV, 30-39 MIN: ICD-10-PCS | Mod: S$GLB,,, | Performed by: INTERNAL MEDICINE

## 2020-06-03 RX ORDER — PRAVASTATIN SODIUM 80 MG/1
80 TABLET ORAL NIGHTLY
Qty: 90 TABLET | Refills: 1 | Status: SHIPPED | OUTPATIENT
Start: 2020-06-03 | End: 2020-12-09 | Stop reason: SDUPTHER

## 2020-06-03 NOTE — PROGRESS NOTES
Subjective:    Patient ID:  Yannick Dorsey is a 70 y.o. male who presents for Coronary Artery Disease (LABS); Hypertension; and Hyperlipidemia        HPI    DISCUSSED LABS AND GOALS CR 1.86, RELATIVELY STABLE, LDL 77, HDL 45, TG 77, DOING OK, C/O COST OF BRILINTA, NO CHEST PAIN NO SIGNIFICANT SHORTNESS OF BREATH NO TIA TYPE SYMPTOMS NO NEAR-SYNCOPE, SEE ROS  Past Medical History:   Diagnosis Date    Adenoma of cecum 7/24/2018    Broken fibula 2/23/2015    left calf    Broken tibia 2/23/2015    left calve    Cancer     SKIN    Compulsive gambling 7/31/2012    COPD (chronic obstructive pulmonary disease)     Coronary artery disease     Heart murmur     HTN (hypertension)     Hyperlipidemia     Kidney disease, chronic, stage III (GFR 30-59 ml/min)     PD (Parkinson's disease) 2009    left tremor-dominant    Polyp of colon     Stroke 1998    Tic 1994    right shoulder     Past Surgical History:   Procedure Laterality Date    ANGIOGRAPHY OF ABDOMEN N/A 9/27/2018    Procedure: Angiogram, Abdomen;  Surgeon: Jose Martin Jama MD;  Location: [facility] CATH;  Service: Cardiology;  Laterality: N/A;    CARDIAC CATHETERIZATION      COLECTOMY Right 7/24/2018    Procedure: COLECTOMY;  Surgeon: Farshad Coles MD;  Location: [facility] OR;  Service: Colon and Rectal;  Laterality: Right;    COLONOSCOPY N/A 6/1/2018    Procedure: COLONOSCOPY;  Surgeon: Ross Leyva MD;  Location: [facility] ENDO;  Service: Endoscopy;  Laterality: N/A;    CORONARY STENT PLACEMENT N/A 10/4/2018    Procedure: INSERTION, STENT, CORONARY ARTERY;  Surgeon: Jose Martin Jama MD;  Location: [facility] CATH;  Service: Cardiology;  Laterality: N/A;    JOINT REPLACEMENT Bilateral     knees    LEFT HEART CATHETERIZATION N/A 9/27/2018    Procedure: Left heart cath;  Surgeon: Jose Martin Jama MD;  Location: [facility] CATH;  Service: Cardiology;  Laterality: N/A;    LEFT HEART CATHETERIZATION Left 10/4/2018    Procedure: Left heart cath;  Surgeon: Jose Martin Jama MD;   Location: [facility] CATH;  Service: Cardiology;  Laterality: Left;    LEG SURGERY Left     PARATHYROIDECTOMY      PROSTATE SURGERY      ROBOT-ASSISTED LAPAROSCOPIC COLECTOMY USING DA SHERRI XI Right 7/24/2018    Procedure: XI ROBOTIC COLECTOMY;  Surgeon: Farshad Coles MD;  Location: [facility] OR;  Service: Colon and Rectal;  Laterality: Right;     Family History   Problem Relation Age of Onset    Tics Brother     Cancer Brother     Cancer Mother     COPD Mother     Stroke Father      Social History     Socioeconomic History    Marital status:      Spouse name: Not on file    Number of children: Not on file    Years of education: Not on file    Highest education level: Not on file   Occupational History    Not on file   Social Needs    Financial resource strain: Not on file    Food insecurity:     Worry: Not on file     Inability: Not on file    Transportation needs:     Medical: Not on file     Non-medical: Not on file   Tobacco Use    Smoking status: Never Smoker    Smokeless tobacco: Never Used   Substance and Sexual Activity    Alcohol use: No    Drug use: No    Sexual activity: Not on file   Lifestyle    Physical activity:     Days per week: Not on file     Minutes per session: Not on file    Stress: Not on file   Relationships    Social connections:     Talks on phone: Not on file     Gets together: Not on file     Attends Bahai service: Not on file     Active member of club or organization: Not on file     Attends meetings of clubs or organizations: Not on file     Relationship status: Not on file   Other Topics Concern    Not on file   Social History Narrative    Not on file       Review of patient's allergies indicates:   Allergen Reactions    Plavix [clopidogrel] Rash    Penicillins      Other reaction(s): Unknown    Pramipexole Other (See Comments)     Compulsive gambling at 0.5mg tid       Current Outpatient Medications:     albuterol (PROVENTIL) 5 mg/mL nebulizer solution,  Take 2.5 mg by nebulization every 6 (six) hours as needed for Wheezing. Rescue, Disp: , Rfl:     amantadine HCl (SYMMETREL) 50 mg/5 mL Soln, TAKE 10 ML (100MG) ONCE A  DAY, Disp: 900 mL, Rfl: 4    amLODIPine (NORVASC) 2.5 MG tablet, TAKE 1 TABLET BY MOUTH  EVERY EVENING, Disp: 90 tablet, Rfl: 0    aspirin (ECOTRIN) 81 MG EC tablet, Take 81 mg by mouth once daily., Disp: , Rfl:     benztropine (COGENTIN) 0.5 MG tablet, Take 1 tablet (0.5 mg total) by mouth 2 (two) times daily., Disp: 180 tablet, Rfl: 4    carbidopa-levodopa  mg (SINEMET CR)  mg TbSR, Take 1 tablet by mouth 3 (three) times daily., Disp: 270 tablet, Rfl: 3    cloNIDine (CATAPRES) 0.1 MG tablet, Take 0.1 mg by mouth daily as needed. , Disp: , Rfl:     cyanocobalamin (VITAMIN B-12) 100 MCG tablet, Take 1 tablet (100 mcg total) by mouth once daily., Disp: 30 tablet, Rfl: 11    ferrous sulfate 324 mg (65 mg iron) TbEC, Take 325 mg by mouth once daily., Disp: , Rfl:     hydrALAZINE (APRESOLINE) 50 MG tablet, Take 1 tablet (50 mg total) by mouth 2 (two) times daily., Disp: 180 tablet, Rfl: 1    pravastatin (PRAVACHOL) 80 MG tablet, Take 1 tablet (80 mg total) by mouth every evening., Disp: 90 tablet, Rfl: 1    PROAIR HFA 90 mcg/actuation inhaler, Inhale 2 puffs into the lungs 4 times daily as needed. , Disp: , Rfl:     SYMBICORT 80-4.5 mcg/actuation HFAA, Inhale 2 puffs into the lungs 2 (two) times daily. , Disp: , Rfl:     vitamin D 1000 units Tab, Take 1,000 Units by mouth once daily., Disp: , Rfl:     nitroGLYCERIN (NITROSTAT) 0.4 MG SL tablet, Place 1 tablet (0.4 mg total) under the tongue every 5 (five) minutes as needed., Disp: 25 tablet, Rfl: 0    Review of Systems   Constitution: Negative for chills, diaphoresis, fever, malaise/fatigue (GENERAL) and night sweats.   HENT: Negative for congestion and nosebleeds (SOME).    Eyes: Negative for blurred vision, double vision and visual disturbance.   Cardiovascular: Negative for  "chest pain, claudication, cyanosis, dyspnea on exertion (MILD, BETTER), irregular heartbeat, leg swelling (OCC), near-syncope, orthopnea, palpitations, paroxysmal nocturnal dyspnea and syncope.   Respiratory: Negative for cough (CHRONIC), hemoptysis, shortness of breath and wheezing (OCC).    Endocrine: Negative for cold intolerance, heat intolerance and polyuria.   Hematologic/Lymphatic: Negative for adenopathy and bleeding problem. Does not bruise/bleed easily.   Skin: Negative for color change, itching and rash.   Musculoskeletal: Negative for back pain and falls (ONCE. TRIPPED).   Gastrointestinal: Negative for abdominal pain, change in bowel habit, dysphagia, heartburn, hematemesis, jaundice, melena and vomiting.   Genitourinary: Negative for dysuria, flank pain and frequency.   Neurological: Negative for brief paralysis, dizziness, focal weakness, light-headedness, loss of balance, tremors (PARKINSON'S) and weakness.   Psychiatric/Behavioral: Negative for altered mental status, depression and memory loss.   Allergic/Immunologic: Negative for hives and persistent infections.        Objective:      Vitals:    06/03/20 1407   BP: 138/72   Pulse: 78   SpO2: 98%   Weight: 67.5 kg (148 lb 13 oz)   Height: 5' 9" (1.753 m)   PainSc: 0-No pain     Body mass index is 21.98 kg/m².    Physical Exam   Constitutional: He is oriented to person, place, and time. He appears well-developed and well-nourished. He is active.   HENT:   Head: Normocephalic and atraumatic.   Mouth/Throat: Oropharynx is clear and moist and mucous membranes are normal.   Eyes: Pupils are equal, round, and reactive to light. Conjunctivae and EOM are normal.   Neck: Normal range of motion. Neck supple. Normal carotid pulses, no hepatojugular reflux and no JVD present. Carotid bruit is not present. No edema and no erythema present. No thyromegaly present.   Cardiovascular: Normal rate and regular rhythm.  No extrasystoles are present. PMI is not " displaced. Exam reveals no gallop, no distant heart sounds, no friction rub and no midsystolic click.   Murmur heard.  High-pitched holosystolic murmur is present with a grade of 2/6 at the apex.  Pulses:       Carotid pulses are 2+ on the right side, and 2+ on the left side.       Radial pulses are 2+ on the right side, and 2+ on the left side.        Femoral pulses are 2+ on the right side, and 2+ on the left side.       Dorsalis pedis pulses are 2+ on the right side, and 2+ on the left side.        Posterior tibial pulses are 2+ on the right side, and 2+ on the left side.   Pulmonary/Chest: Effort normal and breath sounds normal. No accessory muscle usage. No tachypnea and no bradypnea. No respiratory distress.   Abdominal: Soft. Bowel sounds are normal. He exhibits no distension and no mass. There is no hepatosplenomegaly. There is no CVA tenderness.   Musculoskeletal: Normal range of motion. He exhibits no edema or deformity.   SLOW GAIT, USES A CANE   Lymphadenopathy:     He has no cervical adenopathy.   Neurological: He is alert and oriented to person, place, and time. He has normal strength. He displays tremor. No cranial nerve deficit.   SLIGHTLY SLOW GAIT   Skin: Skin is warm and dry. No cyanosis or erythema. No pallor.   Psychiatric: He has a normal mood and affect. His speech is normal and behavior is normal.               ..    Chemistry        Component Value Date/Time     10/04/2018 0627    K 3.6 10/04/2018 0627     10/04/2018 0627    CO2 23 10/04/2018 0627    BUN 31 (H) 10/04/2018 0627    CREATININE 1.76 (H) 10/04/2018 0627    GLU 98 10/04/2018 0627        Component Value Date/Time    CALCIUM 9.5 10/04/2018 0627    ALKPHOS 109 04/08/2016 1600    AST 17 04/08/2016 1600    ALT <5 (L) 04/08/2016 1600    BILITOT 0.3 04/08/2016 1600    ESTGFRAFRICA 46 (A) 10/04/2018 0627    EGFRNONAA 39 (A) 10/04/2018 0627            ..No results found for: CHOL  No results found for: HDL  No results found  for: LDLCALC  No results found for: TRIG  No results found for: CHOLHDL  ..  Lab Results   Component Value Date    WBC 6.14 10/04/2018    HGB 13.5 (L) 10/04/2018    HCT 40.7 10/04/2018    MCV 92 10/04/2018     10/04/2018       Test(s) Reviewed  I have reviewed the following in detail:  [] Stress test   [] Angiography   [] Echocardiogram   [x] Labs   [] Other:       Assessment:         ICD-10-CM ICD-9-CM   1. Coronary artery disease of native artery of native heart with stable angina pectoris I25.118 414.01     413.9   2. Long term (current) use of antithrombotics/antiplatelets Z79.02 V58.63   3. Essential hypertension I10 401.9   4. Hypercholesterolemia E78.00 272.0   5. Non-rheumatic mitral regurgitation I34.0 424.0     Problem List Items Addressed This Visit        Cardiac/Vascular    Non-rheumatic mitral regurgitation    Relevant Orders    Comprehensive metabolic panel    Hypercholesterolemia    Relevant Medications    pravastatin (PRAVACHOL) 80 MG tablet    Other Relevant Orders    Comprehensive metabolic panel    Essential hypertension    Relevant Orders    Comprehensive metabolic panel    Coronary artery disease of native artery of native heart with stable angina pectoris - Primary    Relevant Orders    Comprehensive metabolic panel       Hematology    Long term (current) use of antithrombotics/antiplatelets    Relevant Orders    Comprehensive metabolic panel           Plan:     DC BRILINTA, DAILY ASA , DAILY PRAVACHOL, ALL CV CLINICALLY STABLE, CLASS 0-1 ANGINA, NO HF, NO TIA, NO CLINICAL ARRHYTHMIA,CONTINUE CURRENT MEDS, EDUCATION, DIET, EXERCISE, RETURN TO CLINIC IN 6 MO WITH LABS       Coronary artery disease of native artery of native heart with stable angina pectoris  -     Comprehensive metabolic panel; Future; Expected date: 12/03/2020    Long term (current) use of antithrombotics/antiplatelets  -     Comprehensive metabolic panel; Future; Expected date: 12/03/2020    Essential hypertension  -      Comprehensive metabolic panel; Future; Expected date: 12/03/2020    Hypercholesterolemia  -     Comprehensive metabolic panel; Future; Expected date: 12/03/2020  -     pravastatin (PRAVACHOL) 80 MG tablet; Take 1 tablet (80 mg total) by mouth every evening.  Dispense: 90 tablet; Refill: 1    Non-rheumatic mitral regurgitation  -     Comprehensive metabolic panel; Future; Expected date: 12/03/2020    RTC Low level/low impact aerobic exercise 5x's/wk. Heart healthy diet and risk factor modification.    See labs and med orders.    Aerobic exercise 5x's/wk. Heart healthy diet and risk factor modification.    See labs and med orders.

## 2020-11-11 ENCOUNTER — TELEPHONE (OUTPATIENT)
Dept: NEUROLOGY | Facility: CLINIC | Age: 70
End: 2020-11-11

## 2020-11-11 DIAGNOSIS — G20.A1 PD (PARKINSON'S DISEASE): ICD-10-CM

## 2020-11-11 RX ORDER — AMANTADINE HYDROCHLORIDE 50 MG/5ML
SOLUTION ORAL
Qty: 900 ML | Refills: 1 | Status: SHIPPED | OUTPATIENT
Start: 2020-11-11 | End: 2020-11-19 | Stop reason: SDUPTHER

## 2020-11-11 NOTE — TELEPHONE ENCOUNTER
----- Message from Jacquelyn Gonzalez sent at 11/11/2020 10:07 AM CST -----  Contact: Marissa (wife) @ [phone]  This is a prior pt of Dr Paredes.  He would like to establish care with [facility] on the [facility].  Pls call with an appt.

## 2020-11-17 ENCOUNTER — TELEPHONE (OUTPATIENT)
Dept: NEUROLOGY | Facility: CLINIC | Age: 70
End: 2020-11-17

## 2020-11-17 NOTE — TELEPHONE ENCOUNTER
----- Message from aJlyn Dominguez sent at 11/17/2020  9:07 AM CST -----  Regarding: pt appt  Contact: Marissa (wife) @ [phone]  Caller asking to speak with someone regarding getting an appt with Dr. Julian for patient at the   [place] location, says she has been waiting to hear from the office. Please call.

## 2020-11-19 DIAGNOSIS — G20.A1 PD (PARKINSON'S DISEASE): ICD-10-CM

## 2020-11-19 NOTE — TELEPHONE ENCOUNTER
"Called and spoke with Mrs. Dorsey she was informed Mr. Dorsey will be placed on a list to contact the following week to schedule when April open. Mrs. Dorsey verbalized understanding stating "he needs a refill on Carbidopa-Levodopa and Amantidine, to be sent to [facility]".  "

## 2020-11-24 RX ORDER — AMANTADINE HYDROCHLORIDE 50 MG/5ML
SOLUTION ORAL
Qty: 900 ML | Refills: 1 | Status: SHIPPED | OUTPATIENT
Start: 2020-11-24 | End: 2021-05-31

## 2020-11-24 RX ORDER — CARBIDOPA AND LEVODOPA 50; 200 MG/1; MG/1
1 TABLET, EXTENDED RELEASE ORAL 3 TIMES DAILY
Qty: 270 TABLET | Refills: 3 | Status: SHIPPED | OUTPATIENT
Start: 2020-11-24 | End: 2022-02-15 | Stop reason: SDUPTHER

## 2020-12-09 ENCOUNTER — OFFICE VISIT (OUTPATIENT)
Dept: CARDIOLOGY | Facility: CLINIC | Age: 70
End: 2020-12-09
Payer: MEDICARE

## 2020-12-09 VITALS
HEART RATE: 59 BPM | WEIGHT: 148.13 LBS | HEIGHT: 69 IN | SYSTOLIC BLOOD PRESSURE: 131 MMHG | DIASTOLIC BLOOD PRESSURE: 81 MMHG | BODY MASS INDEX: 21.94 KG/M2 | OXYGEN SATURATION: 99 %

## 2020-12-09 DIAGNOSIS — E78.00 HYPERCHOLESTEROLEMIA: ICD-10-CM

## 2020-12-09 DIAGNOSIS — I25.118 CORONARY ARTERY DISEASE OF NATIVE ARTERY OF NATIVE HEART WITH STABLE ANGINA PECTORIS: Primary | ICD-10-CM

## 2020-12-09 DIAGNOSIS — I10 HYPERTENSION, UNSPECIFIED TYPE: ICD-10-CM

## 2020-12-09 DIAGNOSIS — N18.32 STAGE 3B CHRONIC KIDNEY DISEASE: ICD-10-CM

## 2020-12-09 DIAGNOSIS — I10 ESSENTIAL HYPERTENSION: ICD-10-CM

## 2020-12-09 DIAGNOSIS — I34.0 NON-RHEUMATIC MITRAL REGURGITATION: ICD-10-CM

## 2020-12-09 PROCEDURE — 99214 PR OFFICE/OUTPT VISIT, EST, LEVL IV, 30-39 MIN: ICD-10-PCS | Mod: S$GLB,,, | Performed by: INTERNAL MEDICINE

## 2020-12-09 PROCEDURE — 99214 OFFICE O/P EST MOD 30 MIN: CPT | Mod: S$GLB,,, | Performed by: INTERNAL MEDICINE

## 2020-12-09 RX ORDER — PRAVASTATIN SODIUM 80 MG/1
80 TABLET ORAL NIGHTLY
Qty: 90 TABLET | Refills: 1 | Status: SHIPPED | OUTPATIENT
Start: 2020-12-09 | End: 2021-04-08

## 2020-12-09 RX ORDER — AMLODIPINE BESYLATE 2.5 MG/1
2.5 TABLET ORAL NIGHTLY
Qty: 90 TABLET | Refills: 1 | Status: SHIPPED | OUTPATIENT
Start: 2020-12-09 | End: 2021-05-24

## 2020-12-09 NOTE — PROGRESS NOTES
Subjective:    Patient ID:  Yannick Dorsey is a 70 y.o. male who presents for CAD F/U, Coronary Artery Disease, Hypertension, Hyperlipidemia, and Valvular Heart Disease        HPI  LABS FROM September AT [facility], LDL 61, CR 1.9, GFR 35, CBC OK, DOING OK, GETS TIRED EASILY, ON MULTIPLE INHALERS, MODERATE DISTANT EXERTION NO CHEST PAIN PER SE NO TIA TYPE SYMPTOMS NO NEAR-SYNCOPE, SEE ROS    Past Medical History:   Diagnosis Date    Adenoma of cecum 7/24/2018    Broken fibula 2/23/2015    left calf    Broken tibia 2/23/2015    left calve    Cancer     SKIN    Compulsive gambling 7/31/2012    COPD (chronic obstructive pulmonary disease)     Coronary artery disease     Heart murmur     HTN (hypertension)     Hyperlipidemia     Kidney disease, chronic, stage III (GFR 30-59 ml/min)     PD (Parkinson's disease) 2009    left tremor-dominant    Polyp of colon     Stroke 1998    Tic 1994    right shoulder     Past Surgical History:   Procedure Laterality Date    ANGIOGRAPHY OF ABDOMEN N/A 9/27/2018    Procedure: Angiogram, Abdomen;  Surgeon: Jose Martin Jama MD;  Location: [facility] CATH;  Service: Cardiology;  Laterality: N/A;    CARDIAC CATHETERIZATION      COLECTOMY Right 7/24/2018    Procedure: COLECTOMY;  Surgeon: Farshad Coles MD;  Location: [facility] OR;  Service: Colon and Rectal;  Laterality: Right;    COLONOSCOPY N/A 6/1/2018    Procedure: COLONOSCOPY;  Surgeon: Ross Leyva MD;  Location: [facility] ENDO;  Service: Endoscopy;  Laterality: N/A;    CORONARY STENT PLACEMENT N/A 10/4/2018    Procedure: INSERTION, STENT, CORONARY ARTERY;  Surgeon: Jose Martin Jama MD;  Location: [facility] CATH;  Service: Cardiology;  Laterality: N/A;    JOINT REPLACEMENT Bilateral     knees    LEFT HEART CATHETERIZATION N/A 9/27/2018    Procedure: Left heart cath;  Surgeon: Jose Martin Jama MD;  Location: [facility] CATH;  Service: Cardiology;  Laterality: N/A;    LEFT HEART CATHETERIZATION Left 10/4/2018    Procedure: Left heart cath;   Surgeon: Jose Martin Jama MD;  [facility];  Service: Cardiology;  Laterality: Left;    LEG SURGERY Left     PARATHYROIDECTOMY      PROSTATE SURGERY      ROBOT-ASSISTED LAPAROSCOPIC COLECTOMY USING DA SHERRI XI Right 7/24/2018    Procedure: XI ROBOTIC COLECTOMY;  Surgeon: Farshad Coles MD;  Location: [facility] OR;  Service: Colon and Rectal;  Laterality: Right;     Family History   Problem Relation Age of Onset    Tics Brother     Cancer Brother     Cancer Mother     COPD Mother     Stroke Father      Social History     Socioeconomic History    Marital status:      Spouse name: Not on file    Number of children: Not on file    Years of education: Not on file    Highest education level: Not on file   Occupational History    Not on file   Social Needs    Financial resource strain: Not on file    Food insecurity     Worry: Not on file     Inability: Not on file    Transportation needs     Medical: Not on file     Non-medical: Not on file   Tobacco Use    Smoking status: Never Smoker    Smokeless tobacco: Never Used   Substance and Sexual Activity    Alcohol use: No    Drug use: No    Sexual activity: Not on file   Lifestyle    Physical activity     Days per week: Not on file     Minutes per session: Not on file    Stress: Not on file   Relationships    Social connections     Talks on phone: Not on file     Gets together: Not on file     Attends Latter day service: Not on file     Active member of club or organization: Not on file     Attends meetings of clubs or organizations: Not on file     Relationship status: Not on file   Other Topics Concern    Not on file   Social History Narrative    Not on file       Review of patient's allergies indicates:   Allergen Reactions    Plavix [clopidogrel] Rash    Penicillins      Other reaction(s): Unknown    Pramipexole Other (See Comments)     Compulsive gambling at 0.5mg tid       Current Outpatient Medications:     albuterol (PROVENTIL) 5  mg/mL nebulizer solution, Take 2.5 mg by nebulization every 6 (six) hours as needed for Wheezing. Rescue, Disp: , Rfl:     amantadine HCL (SYMMETREL) 50 mg/5 mL Soln, TAKE 10 ML (100MG) ONCE A  DAY, Disp: 900 mL, Rfl: 1    amLODIPine (NORVASC) 2.5 MG tablet, Take 1 tablet (2.5 mg total) by mouth every evening., Disp: 90 tablet, Rfl: 1    aspirin (ECOTRIN) 81 MG EC tablet, Take 81 mg by mouth once daily., Disp: , Rfl:     benztropine (COGENTIN) 0.5 MG tablet, Take 1 tablet (0.5 mg total) by mouth 2 (two) times daily., Disp: 180 tablet, Rfl: 4    carbidopa-levodopa  mg (SINEMET CR)  mg TbSR, Take 1 tablet by mouth 3 (three) times daily., Disp: 270 tablet, Rfl: 3    cloNIDine (CATAPRES) 0.1 MG tablet, Take 0.1 mg by mouth daily as needed. , Disp: , Rfl:     cyanocobalamin (VITAMIN B-12) 100 MCG tablet, Take 1 tablet (100 mcg total) by mouth once daily., Disp: 30 tablet, Rfl: 11    ferrous sulfate 324 mg (65 mg iron) TbEC, Take 325 mg by mouth once daily., Disp: , Rfl:     hydrALAZINE (APRESOLINE) 50 MG tablet, TAKE 1 TABLET BY MOUTH TWO  TIMES DAILY, Disp: 180 tablet, Rfl: 1    pravastatin (PRAVACHOL) 80 MG tablet, Take 1 tablet (80 mg total) by mouth every evening., Disp: 90 tablet, Rfl: 1    PROAIR HFA 90 mcg/actuation inhaler, Inhale 2 puffs into the lungs 4 times daily as needed. , Disp: , Rfl:     SYMBICORT 80-4.5 mcg/actuation HFAA, Inhale 2 puffs into the lungs 2 (two) times daily. , Disp: , Rfl:     vitamin D 1000 units Tab, Take 1,000 Units by mouth once daily., Disp: , Rfl:     zinc (CHELATED ZINC) 50 mg Tab, Take 50 mg by mouth., Disp: , Rfl:     nitroGLYCERIN (NITROSTAT) 0.4 MG SL tablet, Place 1 tablet (0.4 mg total) under the tongue every 5 (five) minutes as needed. (Patient not taking: Reported on 12/9/2020), Disp: 25 tablet, Rfl: 0    Review of Systems   Constitution: Negative for chills, diaphoresis, fever and night sweats.   HENT: Negative for congestion and nosebleeds.   "  Eyes: Negative for blurred vision, double vision and visual disturbance.   Cardiovascular: Negative for chest pain, claudication, cyanosis, dyspnea on exertion (MILD, TO MODERATE MOSTLY WITH PULMONARY DISEASE), irregular heartbeat, leg swelling (L, ), near-syncope, orthopnea, palpitations, paroxysmal nocturnal dyspnea and syncope.   Respiratory: Negative for cough (CHRONIC), hemoptysis and wheezing (OCC).    Endocrine: Negative for polydipsia and polyuria.   Hematologic/Lymphatic: Negative for adenopathy and bleeding problem. Does not bruise/bleed easily.   Skin: Negative for color change, itching and rash.   Musculoskeletal: Negative for back pain and falls (NOT RECENTLY).   Gastrointestinal: Negative for abdominal pain, change in bowel habit, dysphagia, heartburn, hematemesis, melena and nausea.   Genitourinary: Negative for dysuria, flank pain and frequency.   Neurological: Positive for loss of balance and tremors (PARKINSON'S, STABLE). Negative for brief paralysis, dizziness, focal weakness and light-headedness.   Psychiatric/Behavioral: Negative for altered mental status, depression and memory loss.   Allergic/Immunologic: Negative for persistent infections.        Objective:      Vitals:    12/09/20 1040   BP: 131/81   Pulse: (!) 59   SpO2: 99%   Weight: 67.2 kg (148 lb 2.4 oz)   Height: 5' 9" (1.753 m)   PainSc: 0-No pain     Body mass index is 21.88 kg/m².    Physical Exam   Constitutional: He is oriented to person, place, and time. He appears well-developed and well-nourished. He is active.   HENT:   Head: Normocephalic and atraumatic.   Eyes: Conjunctivae and EOM are normal.   Neck: Neck supple. Normal carotid pulses, no hepatojugular reflux and no JVD present. Carotid bruit is not present.   Cardiovascular: Normal rate and regular rhythm.  No extrasystoles are present. Exam reveals no gallop, no distant heart sounds, no friction rub and no midsystolic click.   Murmur heard.   Holosystolic murmur is " present with a grade of 2/6 at the apex.  Pulses:       Carotid pulses are 2+ on the right side and 2+ on the left side.       Radial pulses are 2+ on the right side and 2+ on the left side.        Posterior tibial pulses are 2+ on the right side and 2+ on the left side.   Pulmonary/Chest: Effort normal and breath sounds normal. No respiratory distress. He has no wheezes. He has no rhonchi (OCCASIONAL). He has no rales.   Abdominal: Soft. There is no hepatosplenomegaly. There is no abdominal tenderness.   Musculoskeletal: Normal range of motion.         General: No deformity or edema.      Comments: SLOW GAIT/ STEPPAGE, USES A CANE, NO ANKLE EDEMA   Neurological: He is alert and oriented to person, place, and time. He displays tremor. No cranial nerve deficit.   Skin: Skin is warm and dry. No rash noted.   Psychiatric: He has a normal mood and affect. His speech is normal and behavior is normal.               ..    Chemistry        Component Value Date/Time     10/04/2018 0627    K 3.6 10/04/2018 0627     10/04/2018 0627    CO2 23 10/04/2018 0627    BUN 31 (H) 10/04/2018 0627    CREATININE 1.76 (H) 10/04/2018 0627    GLU 98 10/04/2018 0627        Component Value Date/Time    CALCIUM 9.5 10/04/2018 0627    ALKPHOS 109 04/08/2016 1600    AST 17 04/08/2016 1600    ALT <5 (L) 04/08/2016 1600    BILITOT 0.3 04/08/2016 1600    ESTGFRAFRICA 46 (A) 10/04/2018 0627    EGFRNONAA 39 (A) 10/04/2018 0627            ..No results found for: CHOL  No results found for: HDL  No results found for: LDLCALC  No results found for: TRIG  No results found for: CHOLHDL  ..  Lab Results   Component Value Date    WBC 6.14 10/04/2018    HGB 13.5 (L) 10/04/2018    HCT 40.7 10/04/2018    MCV 92 10/04/2018     10/04/2018       Test(s) Reviewed  I have reviewed the following in detail:  [] Stress test   [] Angiography   [] Echocardiogram   [] Labs   [] Other:       Assessment:         ICD-10-CM ICD-9-CM   1. Coronary artery  disease of native artery of native heart with stable angina pectoris  I25.118 414.01     413.9   2. Essential hypertension  I10 401.9   3. Non-rheumatic mitral regurgitation  I34.0 424.0   4. Hypercholesterolemia  E78.00 272.0   5. Stage 3b chronic kidney disease  N18.32 585.3   6. Hypertension, unspecified type  I10 401.9     Problem List Items Addressed This Visit        Cardiac/Vascular    Non-rheumatic mitral regurgitation    Hypercholesterolemia    Relevant Medications    pravastatin (PRAVACHOL) 80 MG tablet    Essential hypertension    Coronary artery disease of native artery of native heart with stable angina pectoris - Primary       Renal/    Stage 3 chronic kidney disease      Other Visit Diagnoses     Hypertension, unspecified type        Relevant Medications    amLODIPine (NORVASC) 2.5 MG tablet           Plan:     SYMPTOMS MOSTLY RELATED TO PULMONARY DISEASE, HIS PARKINSON'S SLIGHTLY WORSENING,ALL CV CLINICALLY RELATIVELY STABLE, CLASS 1 ANGINA, NO HF, NO TIA, NO CLINICAL ARRHYTHMIA,CONTINUE CURRENT MEDS, EDUCATION, DIET, EXERCISE , RETURN TO CLINIC IN 6 MO      Coronary artery disease of native artery of native heart with stable angina pectoris    Essential hypertension    Non-rheumatic mitral regurgitation    Hypercholesterolemia  -     pravastatin (PRAVACHOL) 80 MG tablet; Take 1 tablet (80 mg total) by mouth every evening.  Dispense: 90 tablet; Refill: 1    Stage 3b chronic kidney disease    Hypertension, unspecified type  -     amLODIPine (NORVASC) 2.5 MG tablet; Take 1 tablet (2.5 mg total) by mouth every evening.  Dispense: 90 tablet; Refill: 1    RTC Low level/low impact aerobic exercise 5x's/wk. Heart healthy diet and risk factor modification.    See labs and med orders.    Aerobic exercise 5x's/wk. Heart healthy diet and risk factor modification.    See labs and med orders.

## 2021-01-12 ENCOUNTER — TELEPHONE (OUTPATIENT)
Dept: CARDIOLOGY | Facility: CLINIC | Age: 71
End: 2021-01-12

## 2021-01-15 ENCOUNTER — TELEPHONE (OUTPATIENT)
Dept: NEUROLOGY | Facility: CLINIC | Age: 71
End: 2021-01-15

## 2021-02-11 DIAGNOSIS — G20.A1 PARKINSON DISEASE: Primary | ICD-10-CM

## 2021-05-24 ENCOUNTER — OFFICE VISIT (OUTPATIENT)
Dept: NEUROLOGY | Facility: CLINIC | Age: 71
End: 2021-05-24
Payer: MEDICARE

## 2021-05-24 VITALS
HEIGHT: 69 IN | WEIGHT: 141.13 LBS | DIASTOLIC BLOOD PRESSURE: 98 MMHG | BODY MASS INDEX: 20.9 KG/M2 | SYSTOLIC BLOOD PRESSURE: 159 MMHG

## 2021-05-24 DIAGNOSIS — Z91.81 AT HIGH RISK FOR FALLS: ICD-10-CM

## 2021-05-24 DIAGNOSIS — G31.84 MILD COGNITIVE IMPAIRMENT WITH MEMORY LOSS: ICD-10-CM

## 2021-05-24 DIAGNOSIS — R13.10 DYSPHAGIA, UNSPECIFIED TYPE: ICD-10-CM

## 2021-05-24 DIAGNOSIS — M21.6X2 INVERSION DEFORMITY OF FOOT, LEFT: ICD-10-CM

## 2021-05-24 DIAGNOSIS — K59.00 CONSTIPATION, UNSPECIFIED CONSTIPATION TYPE: ICD-10-CM

## 2021-05-24 DIAGNOSIS — G20.C PARKINSONISM, UNSPECIFIED PARKINSONISM TYPE: Primary | ICD-10-CM

## 2021-05-24 PROCEDURE — 99215 OFFICE O/P EST HI 40 MIN: CPT | Mod: S$PBB,,, | Performed by: NURSE PRACTITIONER

## 2021-05-24 PROCEDURE — 99215 OFFICE O/P EST HI 40 MIN: CPT | Mod: PBBFAC,PN | Performed by: NURSE PRACTITIONER

## 2021-05-24 PROCEDURE — 99215 PR OFFICE/OUTPT VISIT, EST, LEVL V, 40-54 MIN: ICD-10-PCS | Mod: S$PBB,,, | Performed by: NURSE PRACTITIONER

## 2021-05-24 PROCEDURE — 99999 PR PBB SHADOW E&M-EST. PATIENT-LVL V: CPT | Mod: PBBFAC,,, | Performed by: NURSE PRACTITIONER

## 2021-05-24 PROCEDURE — 99999 PR PBB SHADOW E&M-EST. PATIENT-LVL V: ICD-10-PCS | Mod: PBBFAC,,, | Performed by: NURSE PRACTITIONER

## 2021-05-25 PROBLEM — Z91.81 AT HIGH RISK FOR FALLS: Status: ACTIVE | Noted: 2021-05-25

## 2021-06-03 ENCOUNTER — TELEPHONE (OUTPATIENT)
Dept: CARDIOLOGY | Facility: CLINIC | Age: 71
End: 2021-06-03

## 2021-06-15 LAB
ALBUMIN SERPL-MCNC: 4.2 G/DL (ref 3.8–4.8)
ALBUMIN/GLOB SERPL: 1.7 {RATIO} (ref 1.2–2.2)
ALP SERPL-CCNC: 95 IU/L (ref 48–121)
ALT SERPL-CCNC: 10 IU/L (ref 0–44)
AST SERPL-CCNC: 17 IU/L (ref 0–40)
BILIRUB SERPL-MCNC: 0.6 MG/DL (ref 0–1.2)
BUN SERPL-MCNC: 32 MG/DL (ref 8–27)
BUN/CREAT SERPL: 16 (ref 10–24)
CALCIUM SERPL-MCNC: 9 MG/DL (ref 8.6–10.2)
CHLORIDE SERPL-SCNC: 103 MMOL/L (ref 96–106)
CO2 SERPL-SCNC: 23 MMOL/L (ref 20–29)
CREAT SERPL-MCNC: 1.95 MG/DL (ref 0.76–1.27)
GLOBULIN SER CALC-MCNC: 2.5 G/DL (ref 1.5–4.5)
GLUCOSE SERPL-MCNC: 103 MG/DL (ref 65–99)
POTASSIUM SERPL-SCNC: 4.2 MMOL/L (ref 3.5–5.2)
PROT SERPL-MCNC: 6.7 G/DL (ref 6–8.5)
SODIUM SERPL-SCNC: 140 MMOL/L (ref 134–144)

## 2021-06-16 ENCOUNTER — OFFICE VISIT (OUTPATIENT)
Dept: CARDIOLOGY | Facility: CLINIC | Age: 71
End: 2021-06-16
Payer: MEDICARE

## 2021-06-16 VITALS
HEART RATE: 72 BPM | SYSTOLIC BLOOD PRESSURE: 126 MMHG | OXYGEN SATURATION: 97 % | WEIGHT: 147.5 LBS | HEIGHT: 69 IN | DIASTOLIC BLOOD PRESSURE: 76 MMHG | BODY MASS INDEX: 21.84 KG/M2

## 2021-06-16 DIAGNOSIS — I34.0 NON-RHEUMATIC MITRAL REGURGITATION: Chronic | ICD-10-CM

## 2021-06-16 DIAGNOSIS — N18.32 STAGE 3B CHRONIC KIDNEY DISEASE: Chronic | ICD-10-CM

## 2021-06-16 DIAGNOSIS — J43.8 OTHER EMPHYSEMA: Chronic | ICD-10-CM

## 2021-06-16 DIAGNOSIS — Z79.82 LONG TERM (CURRENT) USE OF ASPIRIN: Chronic | ICD-10-CM

## 2021-06-16 DIAGNOSIS — E78.00 HYPERCHOLESTEROLEMIA: Chronic | ICD-10-CM

## 2021-06-16 DIAGNOSIS — I25.118 CORONARY ARTERY DISEASE OF NATIVE ARTERY OF NATIVE HEART WITH STABLE ANGINA PECTORIS: Primary | Chronic | ICD-10-CM

## 2021-06-16 DIAGNOSIS — I10 ESSENTIAL HYPERTENSION: Chronic | ICD-10-CM

## 2021-06-16 DIAGNOSIS — G20.A1 PARKINSON'S DISEASE: Chronic | ICD-10-CM

## 2021-06-16 PROCEDURE — 99214 OFFICE O/P EST MOD 30 MIN: CPT | Mod: S$GLB,,, | Performed by: INTERNAL MEDICINE

## 2021-06-16 PROCEDURE — 99214 PR OFFICE/OUTPT VISIT, EST, LEVL IV, 30-39 MIN: ICD-10-PCS | Mod: S$GLB,,, | Performed by: INTERNAL MEDICINE

## 2021-06-16 RX ORDER — PRAVASTATIN SODIUM 80 MG/1
80 TABLET ORAL NIGHTLY
Qty: 90 TABLET | Refills: 1 | Status: SHIPPED | OUTPATIENT
Start: 2021-06-16 | End: 2022-02-08

## 2021-06-25 ENCOUNTER — TELEPHONE (OUTPATIENT)
Dept: NEUROLOGY | Facility: CLINIC | Age: 71
End: 2021-06-25

## 2021-08-10 ENCOUNTER — TELEPHONE (OUTPATIENT)
Dept: NEUROLOGY | Facility: CLINIC | Age: 71
End: 2021-08-10

## 2021-10-12 ENCOUNTER — TELEPHONE (OUTPATIENT)
Dept: NEUROLOGY | Facility: CLINIC | Age: 71
End: 2021-10-12

## 2021-10-12 PROBLEM — J43.2 CENTRILOBULAR EMPHYSEMA: Status: ACTIVE | Noted: 2021-06-16

## 2021-10-12 PROBLEM — R05.3 CHRONIC COUGH: Status: ACTIVE | Noted: 2021-10-12

## 2021-10-12 PROBLEM — J45.30 MILD PERSISTENT ASTHMA: Status: ACTIVE | Noted: 2021-10-12

## 2021-10-12 PROBLEM — R09.82 POSTNASAL DRIP: Status: ACTIVE | Noted: 2021-10-12

## 2021-10-12 PROBLEM — J43.2 CENTRILOBULAR EMPHYSEMA: Status: RESOLVED | Noted: 2021-06-16 | Resolved: 2021-10-12

## 2021-12-16 ENCOUNTER — TELEPHONE (OUTPATIENT)
Dept: CARDIOLOGY | Facility: CLINIC | Age: 71
End: 2021-12-16
Payer: MEDICARE

## 2021-12-18 LAB
ALBUMIN SERPL-MCNC: 4.4 G/DL (ref 3.8–4.8)
ALBUMIN/GLOB SERPL: 1.6 {RATIO} (ref 1.2–2.2)
ALP SERPL-CCNC: 92 IU/L (ref 44–121)
ALT SERPL-CCNC: 12 IU/L (ref 0–44)
AST SERPL-CCNC: 21 IU/L (ref 0–40)
BILIRUB SERPL-MCNC: 0.5 MG/DL (ref 0–1.2)
BUN SERPL-MCNC: 29 MG/DL (ref 8–27)
BUN/CREAT SERPL: 16 (ref 10–24)
CALCIUM SERPL-MCNC: 9.3 MG/DL (ref 8.6–10.2)
CHLORIDE SERPL-SCNC: 102 MMOL/L (ref 96–106)
CHOLEST SERPL-MCNC: 157 MG/DL (ref 100–199)
CO2 SERPL-SCNC: 23 MMOL/L (ref 20–29)
CREAT SERPL-MCNC: 1.79 MG/DL (ref 0.76–1.27)
GLOBULIN SER CALC-MCNC: 2.7 G/DL (ref 1.5–4.5)
GLUCOSE SERPL-MCNC: 101 MG/DL (ref 65–99)
HDLC SERPL-MCNC: 43 MG/DL
LDLC SERPL CALC-MCNC: 95 MG/DL (ref 0–99)
POTASSIUM SERPL-SCNC: 4.2 MMOL/L (ref 3.5–5.2)
PROT SERPL-MCNC: 7.1 G/DL (ref 6–8.5)
SODIUM SERPL-SCNC: 139 MMOL/L (ref 134–144)
TRIGL SERPL-MCNC: 101 MG/DL (ref 0–149)
VLDLC SERPL CALC-MCNC: 19 MG/DL (ref 5–40)

## 2021-12-22 ENCOUNTER — OFFICE VISIT (OUTPATIENT)
Dept: CARDIOLOGY | Facility: CLINIC | Age: 71
End: 2021-12-22
Payer: MEDICARE

## 2021-12-22 VITALS
BODY MASS INDEX: 21.62 KG/M2 | DIASTOLIC BLOOD PRESSURE: 80 MMHG | WEIGHT: 145.94 LBS | SYSTOLIC BLOOD PRESSURE: 119 MMHG | HEART RATE: 72 BPM | HEIGHT: 69 IN

## 2021-12-22 DIAGNOSIS — E78.00 HYPERCHOLESTEROLEMIA: Chronic | ICD-10-CM

## 2021-12-22 DIAGNOSIS — I25.118 CORONARY ARTERY DISEASE OF NATIVE ARTERY OF NATIVE HEART WITH STABLE ANGINA PECTORIS: Primary | ICD-10-CM

## 2021-12-22 DIAGNOSIS — N18.32 STAGE 3B CHRONIC KIDNEY DISEASE: Chronic | ICD-10-CM

## 2021-12-22 DIAGNOSIS — I34.0 NON-RHEUMATIC MITRAL REGURGITATION: ICD-10-CM

## 2021-12-22 DIAGNOSIS — Z79.02 LONG TERM (CURRENT) USE OF ANTITHROMBOTICS/ANTIPLATELETS: Chronic | ICD-10-CM

## 2021-12-22 DIAGNOSIS — I10 ESSENTIAL HYPERTENSION: Chronic | ICD-10-CM

## 2021-12-22 PROCEDURE — 99214 PR OFFICE/OUTPT VISIT, EST, LEVL IV, 30-39 MIN: ICD-10-PCS | Mod: S$GLB,,, | Performed by: INTERNAL MEDICINE

## 2021-12-22 PROCEDURE — 99214 OFFICE O/P EST MOD 30 MIN: CPT | Mod: S$GLB,,, | Performed by: INTERNAL MEDICINE

## 2021-12-22 RX ORDER — POLYETHYLENE GLYCOL 3350 17 G/17G
POWDER, FOR SOLUTION ORAL
COMMUNITY
Start: 2021-12-10

## 2021-12-22 RX ORDER — BISACODYL 5 MG
TABLET, DELAYED RELEASE (ENTERIC COATED) ORAL
COMMUNITY
Start: 2021-12-10

## 2021-12-22 RX ORDER — EZETIMIBE 10 MG/1
10 TABLET ORAL DAILY
Qty: 90 TABLET | Refills: 1 | Status: SHIPPED | OUTPATIENT
Start: 2021-12-22 | End: 2022-04-26

## 2021-12-22 RX ORDER — LOSARTAN POTASSIUM 25 MG/1
25 TABLET ORAL DAILY
COMMUNITY
Start: 2021-12-08 | End: 2022-06-22

## 2022-01-13 ENCOUNTER — CLINICAL SUPPORT (OUTPATIENT)
Dept: CARDIOLOGY | Facility: CLINIC | Age: 72
End: 2022-01-13
Attending: INTERNAL MEDICINE
Payer: MEDICARE

## 2022-01-13 VITALS — WEIGHT: 145 LBS | BODY MASS INDEX: 21.48 KG/M2 | HEIGHT: 69 IN

## 2022-01-13 LAB
ASCENDING AORTA: 3.63 CM
AV INDEX (PROSTH): 0.87
AV MEAN GRADIENT: 3 MMHG
AV PEAK GRADIENT: 6 MMHG
AV VALVE AREA: 3.4 CM2
AV VELOCITY RATIO: 0.99
BSA FOR ECHO PROCEDURE: 1.79 M2
CV ECHO LV RWT: 0.49 CM
DOP CALC AO PEAK VEL: 1.22 M/S
DOP CALC AO VTI: 30.88 CM
DOP CALC LVOT AREA: 3.9 CM2
DOP CALC LVOT DIAMETER: 2.23 CM
DOP CALC LVOT PEAK VEL: 1.21 M/S
DOP CALC LVOT STROKE VOLUME: 105.01 CM3
DOP CALCLVOT PEAK VEL VTI: 26.9 CM
E WAVE DECELERATION TIME: 187.78 MSEC
E/A RATIO: 0.83
E/E' RATIO: 8.93 M/S
ECHO LV POSTERIOR WALL: 1.06 CM (ref 0.6–1.1)
EJECTION FRACTION: 65 %
FRACTIONAL SHORTENING: 38 % (ref 28–44)
INTERVENTRICULAR SEPTUM: 1.08 CM (ref 0.6–1.1)
LA MAJOR: 5.19 CM
LA MINOR: 4.71 CM
LA WIDTH: 3.65 CM
LEFT ATRIUM SIZE: 3.4 CM
LEFT ATRIUM VOLUME INDEX: 28.9 ML/M2
LEFT ATRIUM VOLUME: 52.09 CM3
LEFT INTERNAL DIMENSION IN SYSTOLE: 2.66 CM (ref 2.1–4)
LEFT VENTRICLE DIASTOLIC VOLUME INDEX: 46.08 ML/M2
LEFT VENTRICLE DIASTOLIC VOLUME: 82.94 ML
LEFT VENTRICLE MASS INDEX: 87 G/M2
LEFT VENTRICLE SYSTOLIC VOLUME INDEX: 14.5 ML/M2
LEFT VENTRICLE SYSTOLIC VOLUME: 26.16 ML
LEFT VENTRICULAR INTERNAL DIMENSION IN DIASTOLE: 4.3 CM (ref 3.5–6)
LEFT VENTRICULAR MASS: 156.67 G
LV LATERAL E/E' RATIO: 9.57 M/S
LV SEPTAL E/E' RATIO: 8.38 M/S
MV A" WAVE DURATION": 8.3 MSEC
MV PEAK A VEL: 0.81 M/S
MV PEAK E VEL: 0.67 M/S
MV STENOSIS PRESSURE HALF TIME: 54.46 MS
MV VALVE AREA P 1/2 METHOD: 4.04 CM2
PISA TR MAX VEL: 2.36 M/S
PULM VEIN S/D RATIO: 1.25
PV PEAK D VEL: 0.57 M/S
PV PEAK S VEL: 0.71 M/S
RA MAJOR: 4.35 CM
RA PRESSURE: 3 MMHG
RA WIDTH: 2.56 CM
RIGHT VENTRICULAR END-DIASTOLIC DIMENSION: 3.62 CM
RV TISSUE DOPPLER FREE WALL SYSTOLIC VELOCITY 1 (APICAL 4 CHAMBER VIEW): 14.23 CM/S
SINUS: 3.02 CM
STJ: 3.16 CM
TDI LATERAL: 0.07 M/S
TDI SEPTAL: 0.08 M/S
TDI: 0.08 M/S
TR MAX PG: 22 MMHG
TRICUSPID ANNULAR PLANE SYSTOLIC EXCURSION: 2.08 CM
TV REST PULMONARY ARTERY PRESSURE: 25 MMHG

## 2022-02-15 ENCOUNTER — TELEPHONE (OUTPATIENT)
Dept: NEUROLOGY | Facility: CLINIC | Age: 72
End: 2022-02-15
Payer: MEDICARE

## 2022-02-15 DIAGNOSIS — G20.A1 PD (PARKINSON'S DISEASE): ICD-10-CM

## 2022-02-15 RX ORDER — CARBIDOPA AND LEVODOPA 50; 200 MG/1; MG/1
1 TABLET, EXTENDED RELEASE ORAL 3 TIMES DAILY
Qty: 270 TABLET | Refills: 0 | Status: SHIPPED | OUTPATIENT
Start: 2022-02-15

## 2022-02-15 NOTE — TELEPHONE ENCOUNTER
----- Message from Lucho Escobedo sent at 2/15/2022  1:14 PM CST -----  Regarding: rtn a missed call  Contact: Marissa(spouse)  Marissa(spouse) returning a missed call he is currently having trouble w/her phone and gives permission to schedule a visit on any date besides the 02/17 & the 02/24th <<< if scheduled on the 24th must be before noon.      Pt would like a refill on medication prior to appt       Pt can be reached at [phone] or [facility] [phone] ([facility])

## 2022-02-15 NOTE — TELEPHONE ENCOUNTER
----- Message from Keri Valentino sent at 2/15/2022 12:28 PM CST -----  Regarding: Refill/Appt Request  Pt wife says they have been calling for about 4  months for  an appt and haven't gotten call from anyone Pt is also running out of meds has 3 days left      No solution found before the template release date of 5/31/2022      carbidopa-levodopa  mg (SINEMET CR)  mg TbSR\  [facility] - [place], [place] - [address] [facility], [address] (Ph: [phone])

## 2022-02-15 NOTE — TELEPHONE ENCOUNTER
----- Message from Keri Valentino sent at 2/15/2022 12:28 PM CST -----  Regarding: Refill/Appt Request  Pt wife says they have been calling for about 4  months for  an appt and haven't gotten call from anyone Pt is also running out of meds has 3 days left      No solution found before the template release date of 5/31/2022      carbidopa-levodopa  mg (SINEMET CR)  mg TbSR\  [facility] - [place], [place] - [address] [facility], [address] (Ph: [phone])

## 2022-02-21 ENCOUNTER — TELEPHONE (OUTPATIENT)
Dept: NEUROLOGY | Facility: CLINIC | Age: 72
End: 2022-02-21
Payer: MEDICARE

## 2022-06-03 ENCOUNTER — TELEPHONE (OUTPATIENT)
Dept: CARDIOLOGY | Facility: CLINIC | Age: 72
End: 2022-06-03
Payer: MEDICARE

## 2022-06-03 NOTE — TELEPHONE ENCOUNTER
----- Message from Matthew Dominguez sent at 6/3/2022  1:22 PM CDT -----  Regarding: Call Back  Who Called: Marissa- Wife         What is the reason for the call: calling to have lab orders for appointment 6/22 fax to [facility] [address] in  [place]. Please contact     P: [phone]         Can patient be contacted on [facility]: No          Call back number: [phone]

## 2022-06-09 ENCOUNTER — LAB VISIT (OUTPATIENT)
Dept: LAB | Facility: CLINIC | Age: 72
End: 2022-06-09
Attending: INTERNAL MEDICINE
Payer: MEDICARE

## 2022-06-09 DIAGNOSIS — Z79.02 LONG TERM (CURRENT) USE OF ANTITHROMBOTICS/ANTIPLATELETS: Chronic | ICD-10-CM

## 2022-06-09 DIAGNOSIS — I25.118 CORONARY ARTERY DISEASE OF NATIVE ARTERY OF NATIVE HEART WITH STABLE ANGINA PECTORIS: Chronic | ICD-10-CM

## 2022-06-09 DIAGNOSIS — E78.00 HYPERCHOLESTEROLEMIA: Chronic | ICD-10-CM

## 2022-06-09 LAB
ALBUMIN SERPL BCP-MCNC: 3.9 G/DL (ref 3.5–5.2)
ALP SERPL-CCNC: 80 U/L (ref 55–135)
ALT SERPL W/O P-5'-P-CCNC: <5 U/L (ref 10–44)
ANION GAP SERPL CALC-SCNC: 11 MMOL/L (ref 8–16)
AST SERPL-CCNC: 20 U/L (ref 10–40)
BILIRUB SERPL-MCNC: 0.7 MG/DL (ref 0.1–1)
BUN SERPL-MCNC: 32 MG/DL (ref 8–23)
CALCIUM SERPL-MCNC: 9.2 MG/DL (ref 8.7–10.5)
CHLORIDE SERPL-SCNC: 102 MMOL/L (ref 95–110)
CHOLEST SERPL-MCNC: 115 MG/DL (ref 120–199)
CHOLEST/HDLC SERPL: 2.8 {RATIO} (ref 2–5)
CO2 SERPL-SCNC: 24 MMOL/L (ref 23–29)
CREAT SERPL-MCNC: 1.8 MG/DL (ref 0.5–1.4)
EST. GFR  (AFRICAN AMERICAN): 43.1 ML/MIN/1.73 M^2
EST. GFR  (NON AFRICAN AMERICAN): 37.3 ML/MIN/1.73 M^2
GLUCOSE SERPL-MCNC: 99 MG/DL (ref 70–110)
HDLC SERPL-MCNC: 41 MG/DL (ref 40–75)
HDLC SERPL: 35.7 % (ref 20–50)
HGB BLD-MCNC: 13.3 G/DL (ref 14–18)
LDLC SERPL CALC-MCNC: 57.6 MG/DL (ref 63–159)
NONHDLC SERPL-MCNC: 74 MG/DL
POTASSIUM SERPL-SCNC: 4.3 MMOL/L (ref 3.5–5.1)
PROT SERPL-MCNC: 7.2 G/DL (ref 6–8.4)
SODIUM SERPL-SCNC: 137 MMOL/L (ref 136–145)
TRIGL SERPL-MCNC: 82 MG/DL (ref 30–150)

## 2022-06-09 PROCEDURE — 36415 COLL VENOUS BLD VENIPUNCTURE: CPT | Mod: ,,, | Performed by: INTERNAL MEDICINE

## 2022-06-09 PROCEDURE — 36415 PR COLLECTION VENOUS BLOOD,VENIPUNCTURE: ICD-10-PCS | Mod: ,,, | Performed by: INTERNAL MEDICINE

## 2022-06-09 PROCEDURE — 80061 LIPID PANEL: CPT | Performed by: INTERNAL MEDICINE

## 2022-06-09 PROCEDURE — 80053 COMPREHEN METABOLIC PANEL: CPT | Performed by: INTERNAL MEDICINE

## 2022-06-09 PROCEDURE — 85018 HEMOGLOBIN: CPT | Performed by: INTERNAL MEDICINE

## 2022-06-22 ENCOUNTER — OFFICE VISIT (OUTPATIENT)
Dept: CARDIOLOGY | Facility: CLINIC | Age: 72
End: 2022-06-22
Payer: MEDICARE

## 2022-06-22 VITALS
HEIGHT: 69 IN | SYSTOLIC BLOOD PRESSURE: 112 MMHG | HEART RATE: 71 BPM | BODY MASS INDEX: 20.83 KG/M2 | WEIGHT: 140.63 LBS | DIASTOLIC BLOOD PRESSURE: 73 MMHG

## 2022-06-22 DIAGNOSIS — G20.A1 PARKINSON'S DISEASE: Chronic | ICD-10-CM

## 2022-06-22 DIAGNOSIS — E78.00 HYPERCHOLESTEROLEMIA: Chronic | ICD-10-CM

## 2022-06-22 DIAGNOSIS — I25.118 CORONARY ARTERY DISEASE OF NATIVE ARTERY OF NATIVE HEART WITH STABLE ANGINA PECTORIS: Primary | Chronic | ICD-10-CM

## 2022-06-22 DIAGNOSIS — I34.0 NON-RHEUMATIC MITRAL REGURGITATION: Chronic | ICD-10-CM

## 2022-06-22 DIAGNOSIS — N18.32 STAGE 3B CHRONIC KIDNEY DISEASE: Chronic | ICD-10-CM

## 2022-06-22 DIAGNOSIS — I10 ESSENTIAL HYPERTENSION: Chronic | ICD-10-CM

## 2022-06-22 DIAGNOSIS — Z79.02 LONG TERM (CURRENT) USE OF ANTITHROMBOTICS/ANTIPLATELETS: Chronic | ICD-10-CM

## 2022-06-22 PROCEDURE — 99214 OFFICE O/P EST MOD 30 MIN: CPT | Mod: S$GLB,,, | Performed by: INTERNAL MEDICINE

## 2022-06-22 PROCEDURE — 99214 PR OFFICE/OUTPT VISIT, EST, LEVL IV, 30-39 MIN: ICD-10-PCS | Mod: S$GLB,,, | Performed by: INTERNAL MEDICINE

## 2022-06-22 RX ORDER — PRAVASTATIN SODIUM 80 MG/1
80 TABLET ORAL NIGHTLY
Qty: 90 TABLET | Refills: 1 | Status: SHIPPED | OUTPATIENT
Start: 2022-06-22 | End: 2022-08-13

## 2022-06-22 NOTE — PROGRESS NOTES
Subjective:    Patient ID:  Yannick Dorsey is a 72 y.o. male who presents for Coronary Artery Disease and Fatigue        HPI  DISCUSSED LABS AND GOALS GFR 37 STABLE, LDL 57, HDL 41, HB 13.3, OFF LOSARTAN, DOING OK, SEE ROS    Past Medical History:   Diagnosis Date    Adenoma of cecum 7/24/2018    Broken fibula 2/23/2015    left calf    Broken tibia 2/23/2015    left calve    Cancer     SKIN    Compulsive gambling 7/31/2012    COPD (chronic obstructive pulmonary disease)     Coronary artery disease     Heart murmur     HTN (hypertension)     Hyperlipidemia     Kidney disease, chronic, stage III (GFR 30-59 ml/min)     PD (Parkinson's disease) 2009    left tremor-dominant    Polyp of colon     Stroke 1998    Tic 1994    right shoulder     Past Surgical History:   Procedure Laterality Date    ANGIOGRAPHY OF ABDOMEN N/A 9/27/2018    Procedure: Angiogram, Abdomen;  Surgeon: Jose Martin Jama MD;  Location: [facility] CATH;  Service: Cardiology;  Laterality: N/A;    CARDIAC CATHETERIZATION      COLECTOMY Right 7/24/2018    Procedure: COLECTOMY;  Surgeon: Farshad Coles MD;  Location: [facility] OR;  Service: Colon and Rectal;  Laterality: Right;    COLONOSCOPY N/A 6/1/2018    Procedure: COLONOSCOPY;  Surgeon: Ross Leyva MD;  Location: [facility] ENDO;  Service: Endoscopy;  Laterality: N/A;    COLONOSCOPY N/A 12/13/2021    Procedure: COLONOSCOPY;  Surgeon: Ross Leyva MD;  Location: [facility] ENDO;  Service: Endoscopy;  Laterality: N/A;    CORONARY STENT PLACEMENT N/A 10/4/2018    Procedure: INSERTION, STENT, CORONARY ARTERY;  Surgeon: Jose Martin Jama MD;  Location: [facility] CATH;  Service: Cardiology;  Laterality: N/A;    JOINT REPLACEMENT Bilateral     knees    LEFT HEART CATHETERIZATION N/A 9/27/2018    Procedure: Left heart cath;  Surgeon: Jose Mratin Jama MD;  Location: [facility] CATH;  Service: Cardiology;  Laterality: N/A;    LEFT HEART CATHETERIZATION Left 10/4/2018    Procedure: Left heart cath;  Surgeon:  Jose Martin Jama MD;  Location: [facility] CATH;  Service: Cardiology;  Laterality: Left;    LEG SURGERY Left     PARATHYROIDECTOMY      PROSTATE SURGERY      ROBOT-ASSISTED LAPAROSCOPIC COLECTOMY USING DA SHERRI XI Right 7/24/2018    Procedure: XI ROBOTIC COLECTOMY;  Surgeon: Farshad Coles MD;  Location: [facility] OR;  Service: Colon and Rectal;  Laterality: Right;     Family History   Problem Relation Age of Onset    Tics Brother     Cancer Brother     Cancer Mother     COPD Mother     Stroke Father      Social History     Socioeconomic History    Marital status:    Tobacco Use    Smoking status: Never Smoker    Smokeless tobacco: Never Used   Substance and Sexual Activity    Alcohol use: No    Drug use: No       Review of patient's allergies indicates:   Allergen Reactions    Plavix [clopidogrel] Rash    Penicillins Hives     Other reaction(s): Unknown  Other reaction(s): Unknown    Pramipexole Other (See Comments)     Compulsive gambling at 0.5mg tid       Current Outpatient Medications:     albuterol (PROAIR HFA) 90 mcg/actuation inhaler, Inhale 2 puffs into the lungs every 6 (six) hours as needed for Wheezing., Disp: 8 g, Rfl: 6    albuterol (PROVENTIL) 5 mg/mL nebulizer solution, Take 0.5 mLs (2.5 mg total) by nebulization every 6 (six) hours as needed for Wheezing. Rescue, Disp: 90 vial, Rfl: 6    amantadine HCL (SYMMETREL) 50 mg/5 mL Soln, TAKE 10 ML (100 MG) BY  MOUTH DAILY, Disp: 900 mL, Rfl: 3    amLODIPine (NORVASC) 2.5 MG tablet, TAKE 1 TABLET BY MOUTH IN  THE EVENING, Disp: 90 tablet, Rfl: 1    ascorbic acid, vitamin C, (VITAMIN C) 1000 MG tablet, Take 1,000 mg by mouth., Disp: , Rfl:     aspirin (ECOTRIN) 81 MG EC tablet, Take 81 mg by mouth once daily., Disp: , Rfl:     azelastine (ASTELIN) 137 mcg (0.1 %) nasal spray, 1 spray (137 mcg total) by Nasal route 2 (two) times daily., Disp: 90 mL, Rfl: 3    benztropine (COGENTIN) 0.5 MG tablet, TAKE 1 TABLET BY MOUTH  TWICE DAILY, Disp:  180 tablet, Rfl: 3    bisacodyL (DULCOLAX) 5 mg EC tablet, Take by mouth., Disp: , Rfl:     carbidopa-levodopa  mg (SINEMET CR)  mg TbSR, Take 1 tablet by mouth 3 (three) times daily., Disp: 270 tablet, Rfl: 0    cloNIDine (CATAPRES) 0.1 MG tablet, Take 0.1 mg by mouth daily as needed. , Disp: , Rfl:     cyanocobalamin (VITAMIN B-12) 100 MCG tablet, Take 1 tablet (100 mcg total) by mouth once daily., Disp: 30 tablet, Rfl: 11    ezetimibe (ZETIA) 10 mg tablet, TAKE 1 TABLET BY MOUTH ONCE DAILY, Disp: 90 tablet, Rfl: 0    ferrous sulfate 324 mg (65 mg iron) TbEC, Take 325 mg by mouth once daily., Disp: , Rfl:     fluticasone-salmeterol diskus inhaler 250-50 mcg, Inhale 1 puff into the lungs 2 (two) times a day. Controller, Disp: 180 each, Rfl: 3    hydrALAZINE (APRESOLINE) 50 MG tablet, TAKE 1 TABLET BY MOUTH TWO  TIMES DAILY, Disp: 180 tablet, Rfl: 1    polyethylene glycol (GLYCOLAX) 17 gram/dose powder, Take by mouth., Disp: , Rfl:     vitamin D 1000 units Tab, Take 1,000 Units by mouth once daily., Disp: , Rfl:     nitroGLYCERIN (NITROSTAT) 0.4 MG SL tablet, Place 1 tablet (0.4 mg total) under the tongue every 5 (five) minutes as needed., Disp: 25 tablet, Rfl: 0    pravastatin (PRAVACHOL) 80 MG tablet, Take 1 tablet (80 mg total) by mouth every evening., Disp: 90 tablet, Rfl: 1    Review of Systems   Constitutional: Negative for chills, diaphoresis, fever, malaise/fatigue and night sweats.   HENT: Negative for congestion and sore throat.    Eyes: Positive for visual disturbance. Negative for pain.   Cardiovascular: Positive for dyspnea on exertion (MODERATE). Negative for chest pain, claudication, cyanosis, irregular heartbeat, leg swelling (OCC), near-syncope, orthopnea, palpitations, paroxysmal nocturnal dyspnea and syncope.   Respiratory: Negative for hemoptysis and wheezing.    Hematologic/Lymphatic: Negative for adenopathy. Does not bruise/bleed easily.   Skin: Negative for color change  "and rash.   Musculoskeletal: Negative for back pain and falls.   Gastrointestinal: Negative for abdominal pain, change in bowel habit, dysphagia, jaundice, melena and nausea.   Genitourinary: Negative for dysuria and flank pain.   Neurological: Positive for loss of balance (WORSE) and tremors (SAME). Negative for brief paralysis, dizziness, focal weakness and light-headedness.   Psychiatric/Behavioral: Negative for depression and memory loss (SOME).   Allergic/Immunologic: Negative.         Objective:      Vitals:    06/22/22 1141   BP: 112/73   Pulse: 71   Weight: 63.8 kg (140 lb 10.5 oz)   Height: 5' 9" (1.753 m)   PainSc: 0-No pain     Body mass index is 20.77 kg/m².    Physical Exam  Constitutional:       Appearance: He is well-developed.   HENT:      Head: Normocephalic and atraumatic.   Eyes:      General: No scleral icterus.     Extraocular Movements: Extraocular movements intact.   Neck:      Vascular: Normal carotid pulses. No JVD.   Cardiovascular:      Rate and Rhythm: Normal rate and regular rhythm.  No extrasystoles are present.     Pulses:           Carotid pulses are 2+ on the right side and 2+ on the left side.       Radial pulses are 2+ on the right side and 2+ on the left side.        Posterior tibial pulses are 2+ on the right side and 2+ on the left side.      Heart sounds: Murmur heard.    Systolic murmur is present with a grade of 1/6 at the lower left sternal border.    No friction rub. No gallop. No S4 sounds.   Pulmonary:      Effort: Pulmonary effort is normal. No respiratory distress.      Breath sounds: Rhonchi present.   Chest:      Chest wall: No tenderness.   Abdominal:      Palpations: Abdomen is soft. There is no hepatomegaly.      Tenderness: There is no abdominal tenderness.   Musculoskeletal:         General: Normal range of motion.      Cervical back: Neck supple.      Right lower leg: No edema.      Left lower leg: No edema.      Comments:  USES A WALKER   Skin:     General: " Skin is warm and dry.      Capillary Refill: Capillary refill takes less than 2 seconds.   Neurological:      Mental Status: He is alert and oriented to person, place, and time.      Cranial Nerves: No cranial nerve deficit.      Motor: Tremor present.   Psychiatric:         Mood and Affect: Mood normal.         Speech: Speech is delayed.         Behavior: Behavior normal.                 ..    Chemistry        Component Value Date/Time     06/09/2022 1037    K 4.3 06/09/2022 1037     06/09/2022 1037    CO2 24 06/09/2022 1037    BUN 32 (H) 06/09/2022 1037    CREATININE 1.8 (H) 06/09/2022 1037    GLU 99 06/09/2022 1037        Component Value Date/Time    CALCIUM 9.2 06/09/2022 1037    ALKPHOS 80 06/09/2022 1037    AST 20 06/09/2022 1037    ALT <5 (L) 06/09/2022 1037    BILITOT 0.7 06/09/2022 1037    ESTGFRAFRICA 43.1 (A) 06/09/2022 1037    EGFRNONAA 37.3 (A) 06/09/2022 1037            ..  Lab Results   Component Value Date    CHOL 115 (L) 06/09/2022    CHOL 157 12/17/2021     Lab Results   Component Value Date    HDL 41 06/09/2022    HDL 43 12/17/2021     Lab Results   Component Value Date    LDLCALC 57.6 (L) 06/09/2022    LDLCALC 95 12/17/2021     Lab Results   Component Value Date    TRIG 82 06/09/2022    TRIG 101 12/17/2021     Lab Results   Component Value Date    CHOLHDL 35.7 06/09/2022     ..  Lab Results   Component Value Date    WBC 6.14 10/04/2018    HGB 13.3 (L) 06/09/2022    HCT 40.7 10/04/2018    MCV 92 10/04/2018     10/04/2018       Test(s) Reviewed  I have reviewed the following in detail:  [] Stress test   [] Angiography   [] Echocardiogram   [x] Labs   [] Other:       Assessment:         ICD-10-CM ICD-9-CM   1. Coronary artery disease of native artery of native heart with stable angina pectoris  I25.118 414.01     413.9   2. Non-rheumatic mitral regurgitation  I34.0 424.0   3. Long term (current) use of antithrombotics/antiplatelets  Z79.02 V58.63   4. Hypercholesterolemia  E78.00  272.0   5. Essential hypertension  I10 401.9   6. Stage 3b chronic kidney disease  N18.32 585.3   7. Parkinson's disease  G20 332.0     Problem List Items Addressed This Visit        Neuro    Parkinson's disease    Overview     classic type, left-predominant tremor           Relevant Medications    pravastatin (PRAVACHOL) 80 MG tablet       Cardiac/Vascular    Non-rheumatic mitral regurgitation    Hypercholesterolemia    Relevant Medications    pravastatin (PRAVACHOL) 80 MG tablet    Other Relevant Orders    Comprehensive Metabolic Panel    Essential hypertension    Relevant Orders    Comprehensive Metabolic Panel    Coronary artery disease of native artery of native heart with stable angina pectoris - Primary    Relevant Orders    Comprehensive Metabolic Panel    Hemoglobin       Renal/    Stage 3 chronic kidney disease    Relevant Orders    Comprehensive Metabolic Panel       Hematology    Long term (current) use of antithrombotics/antiplatelets    Relevant Orders    Hemoglobin           Plan:     ALL CV CLINICALLY STABLE, CLASS 1-2 ANGINA, NO HF, NO TIA, NO CLINICAL ARRHYTHMIA,CONTINUE CURRENT MEDS, EDUCATION, DIET, EXERCISE MUCH AS POSSIBLE RETURN TO CLINIC 6 MO WITH LABS, WE CV RISK WITH CKD      Coronary artery disease of native artery of native heart with stable angina pectoris  -     Comprehensive Metabolic Panel; Future; Expected date: 12/22/2022  -     Hemoglobin; Future; Expected date: 12/22/2022    Non-rheumatic mitral regurgitation    Long term (current) use of antithrombotics/antiplatelets  -     Hemoglobin; Future; Expected date: 12/22/2022    Hypercholesterolemia  -     Comprehensive Metabolic Panel; Future; Expected date: 12/22/2022    Essential hypertension  -     Comprehensive Metabolic Panel; Future; Expected date: 12/22/2022    Stage 3b chronic kidney disease  -     Comprehensive Metabolic Panel; Future; Expected date: 12/22/2022    Parkinson's disease  Comments:  ADVANCING  Orders:  -      pravastatin (PRAVACHOL) 80 MG tablet; Take 1 tablet (80 mg total) by mouth every evening.  Dispense: 90 tablet; Refill: 1    RTC Low level/low impact aerobic exercise 5x's/wk. Heart healthy diet and risk factor modification.    See labs and med orders.    Aerobic exercise 5x's/wk. Heart healthy diet and risk factor modification.    See labs and med orders.

## 2022-08-10 ENCOUNTER — IMMUNIZATION (OUTPATIENT)
Dept: FAMILY MEDICINE | Facility: CLINIC | Age: 72
End: 2022-08-10
Payer: MEDICARE

## 2022-08-10 DIAGNOSIS — Z23 NEED FOR VACCINATION: Primary | ICD-10-CM

## 2022-08-10 PROCEDURE — 0054A COVID-19, MRNA, LNP-S, PF, 30 MCG/0.3 ML DOSE VACCINE (PFIZER): CPT | Mod: PBBFAC,PO

## 2022-12-20 ENCOUNTER — TELEPHONE (OUTPATIENT)
Dept: CARDIOLOGY | Facility: CLINIC | Age: 72
End: 2022-12-20
Payer: MEDICARE

## 2022-12-20 DIAGNOSIS — I25.118 CORONARY ARTERY DISEASE OF NATIVE ARTERY OF NATIVE HEART WITH STABLE ANGINA PECTORIS: Primary | ICD-10-CM

## 2022-12-20 DIAGNOSIS — Z79.02 LONG TERM (CURRENT) USE OF ANTITHROMBOTICS/ANTIPLATELETS: ICD-10-CM

## 2022-12-20 DIAGNOSIS — E78.00 HYPERCHOLESTEROLEMIA: ICD-10-CM

## 2022-12-20 DIAGNOSIS — N18.32 STAGE 3B CHRONIC KIDNEY DISEASE: ICD-10-CM

## 2022-12-20 NOTE — TELEPHONE ENCOUNTER
----- Message from Rosales Tavera sent at 12/20/2022 12:15 PM CST -----  Contact: pt at [phone]  Type: Needs Medical Advice  Who Called:  pt    Best Call Back Number: [phone]    Additional Information: pt is calling the office to see if he need blood work before his upcoming appt. Please call back and advise.

## 2023-01-04 ENCOUNTER — OFFICE VISIT (OUTPATIENT)
Dept: CARDIOLOGY | Facility: CLINIC | Age: 73
End: 2023-01-04
Payer: MEDICARE

## 2023-01-04 VITALS
DIASTOLIC BLOOD PRESSURE: 53 MMHG | WEIGHT: 135.56 LBS | SYSTOLIC BLOOD PRESSURE: 90 MMHG | BODY MASS INDEX: 20.08 KG/M2 | HEIGHT: 69 IN | HEART RATE: 70 BPM

## 2023-01-04 DIAGNOSIS — N18.32 STAGE 3B CHRONIC KIDNEY DISEASE: Chronic | ICD-10-CM

## 2023-01-04 DIAGNOSIS — I95.2 HYPOTENSION DUE TO DRUGS: ICD-10-CM

## 2023-01-04 DIAGNOSIS — I25.118 CORONARY ARTERY DISEASE OF NATIVE ARTERY OF NATIVE HEART WITH STABLE ANGINA PECTORIS: Primary | Chronic | ICD-10-CM

## 2023-01-04 DIAGNOSIS — E78.00 HYPERCHOLESTEROLEMIA: Chronic | ICD-10-CM

## 2023-01-04 DIAGNOSIS — I34.0 NON-RHEUMATIC MITRAL REGURGITATION: Chronic | ICD-10-CM

## 2023-01-04 LAB
ALBUMIN SERPL-MCNC: 3.9 G/DL (ref 3.8–4.8)
ALBUMIN/GLOB SERPL: 1.3 {RATIO} (ref 1.2–2.2)
ALP SERPL-CCNC: 98 IU/L (ref 44–121)
ALT SERPL-CCNC: 7 IU/L (ref 0–44)
AST SERPL-CCNC: 15 IU/L (ref 0–40)
BILIRUB SERPL-MCNC: 0.4 MG/DL (ref 0–1.2)
BUN SERPL-MCNC: 36 MG/DL (ref 8–27)
BUN/CREAT SERPL: 19 (ref 10–24)
CALCIUM SERPL-MCNC: 9 MG/DL (ref 8.6–10.2)
CHLORIDE SERPL-SCNC: 99 MMOL/L (ref 96–106)
CO2 SERPL-SCNC: 22 MMOL/L (ref 20–29)
CREAT SERPL-MCNC: 1.94 MG/DL (ref 0.76–1.27)
EST. GFR  (NO RACE VARIABLE): 37 ML/MIN/1.73
GLOBULIN SER CALC-MCNC: 2.9 G/DL (ref 1.5–4.5)
GLUCOSE SERPL-MCNC: 118 MG/DL (ref 70–99)
HGB BLD-MCNC: 13.5 G/DL (ref 13–17.7)
POTASSIUM SERPL-SCNC: 4.1 MMOL/L (ref 3.5–5.2)
PROT SERPL-MCNC: 6.8 G/DL (ref 6–8.5)
SODIUM SERPL-SCNC: 140 MMOL/L (ref 134–144)

## 2023-01-04 PROCEDURE — 99214 OFFICE O/P EST MOD 30 MIN: CPT | Mod: S$GLB,,, | Performed by: INTERNAL MEDICINE

## 2023-01-04 PROCEDURE — 99214 PR OFFICE/OUTPT VISIT, EST, LEVL IV, 30-39 MIN: ICD-10-PCS | Mod: S$GLB,,, | Performed by: INTERNAL MEDICINE

## 2023-01-04 NOTE — PROGRESS NOTES
Subjective:    Patient ID:  Yannick Dorsey is a 73 y.o. male who presents for Coronary Artery Disease and Valvular Heart Disease        HPI  DISCUSSED LABS CREATININE 1.94 GFR 37, HEMOGLOBIN 13.5, BP LOW PCP DECREASED MEDS, SEE ROS    Past Medical History:   Diagnosis Date    Adenoma of cecum 7/24/2018    Broken fibula 2/23/2015    left calf    Broken tibia 2/23/2015    left calve    Cancer     SKIN    Compulsive gambling 7/31/2012    COPD (chronic obstructive pulmonary disease)     Coronary artery disease     Heart murmur     HTN (hypertension)     Hyperlipidemia     Kidney disease, chronic, stage III (GFR 30-59 ml/min)     PD (Parkinson's disease) 2009    left tremor-dominant    Polyp of colon     Stroke 1998    Tic 1994    right shoulder     Past Surgical History:   Procedure Laterality Date    ANGIOGRAPHY OF ABDOMEN N/A 9/27/2018    Procedure: Angiogram, Abdomen;  Surgeon: Jose Martin Jama MD;  Location: [facility] CATH;  Service: Cardiology;  Laterality: N/A;    CARDIAC CATHETERIZATION      COLECTOMY Right 7/24/2018    Procedure: COLECTOMY;  Surgeon: Farshad Coles MD;  Location: [facility] OR;  Service: Colon and Rectal;  Laterality: Right;    COLONOSCOPY N/A 6/1/2018    Procedure: COLONOSCOPY;  Surgeon: Ross Leyva MD;  Location: [facility] ENDO;  Service: Endoscopy;  Laterality: N/A;    COLONOSCOPY N/A 12/13/2021    Procedure: COLONOSCOPY;  Surgeon: Ross Leyva MD;  Location: [facility] ENDO;  Service: Endoscopy;  Laterality: N/A;    CORONARY STENT PLACEMENT N/A 10/4/2018    Procedure: INSERTION, STENT, CORONARY ARTERY;  Surgeon: Jose Martin Jama MD;  Location: [facility] CATH;  Service: Cardiology;  Laterality: N/A;    JOINT REPLACEMENT Bilateral     knees    LEFT HEART CATHETERIZATION N/A 9/27/2018    Procedure: Left heart cath;  Surgeon: Jose Martin Jama MD;  Location: [facility] CATH;  Service: Cardiology;  Laterality: N/A;    LEFT HEART CATHETERIZATION Left 10/4/2018    Procedure: Left heart cath;  Surgeon: Jose Martin Jama,  MD;  Location: [facility] CATH;  Service: Cardiology;  Laterality: Left;    LEG SURGERY Left     PARATHYROIDECTOMY      PROSTATE SURGERY      ROBOT-ASSISTED LAPAROSCOPIC COLECTOMY USING DA SHERRI XI Right 7/24/2018    Procedure: XI ROBOTIC COLECTOMY;  Surgeon: Farshad Coles MD;  Location: [facility] OR;  Service: Colon and Rectal;  Laterality: Right;     Family History   Problem Relation Age of Onset    Tics Brother     Cancer Brother     Cancer Mother     COPD Mother     Stroke Father      Social History     Socioeconomic History    Marital status:    Tobacco Use    Smoking status: Never    Smokeless tobacco: Never   Substance and Sexual Activity    Alcohol use: No    Drug use: No       Review of patient's allergies indicates:   Allergen Reactions    Plavix [clopidogrel] Rash    Penicillins Hives     Other reaction(s): Unknown  Other reaction(s): Unknown    Pramipexole Other (See Comments)     Compulsive gambling at 0.5mg tid       Current Outpatient Medications:     albuterol (PROAIR HFA) 90 mcg/actuation inhaler, Inhale 2 puffs into the lungs every 6 (six) hours as needed for Wheezing., Disp: 8 g, Rfl: 6    albuterol (PROVENTIL) 5 mg/mL nebulizer solution, Take 0.5 mLs (2.5 mg total) by nebulization every 6 (six) hours as needed for Wheezing. Rescue, Disp: 90 vial, Rfl: 6    amantadine HCL (SYMMETREL) 50 mg/5 mL Soln, TAKE 10 ML (100 MG) BY  MOUTH DAILY, Disp: 900 mL, Rfl: 3    amLODIPine (NORVASC) 2.5 MG tablet, TAKE 1 TABLET BY MOUTH IN  THE EVENING, Disp: 90 tablet, Rfl: 1    ascorbic acid, vitamin C, (VITAMIN C) 1000 MG tablet, Take 1,000 mg by mouth., Disp: , Rfl:     aspirin (ECOTRIN) 81 MG EC tablet, Take 81 mg by mouth once daily., Disp: , Rfl:     benztropine (COGENTIN) 0.5 MG tablet, TAKE 1 TABLET BY MOUTH  TWICE DAILY, Disp: 180 tablet, Rfl: 3    bisacodyL (DULCOLAX) 5 mg EC tablet, Take by mouth., Disp: , Rfl:     carbidopa-levodopa  mg (SINEMET CR)  mg TbSR, Take 1 tablet by mouth 3 (three)  times daily., Disp: 270 tablet, Rfl: 0    ezetimibe (ZETIA) 10 mg tablet, TAKE 1 TABLET BY MOUTH ONCE DAILY, Disp: 90 tablet, Rfl: 1    ferrous sulfate 324 mg (65 mg iron) TbEC, Take 325 mg by mouth once daily., Disp: , Rfl:     fluticasone-salmeterol diskus inhaler 250-50 mcg, Inhale 1 puff into the lungs 2 (two) times a day. Controller, Disp: 180 each, Rfl: 3    nitroGLYCERIN (NITROSTAT) 0.4 MG SL tablet, Place 1 tablet (0.4 mg total) under the tongue every 5 (five) minutes as needed., Disp: 25 tablet, Rfl: 0    polyethylene glycol (GLYCOLAX) 17 gram/dose powder, Take by mouth., Disp: , Rfl:     pravastatin (PRAVACHOL) 80 MG tablet, TAKE 1 TABLET BY MOUTH IN  THE EVENING, Disp: 90 tablet, Rfl: 1    vitamin D 1000 units Tab, Take 1,000 Units by mouth once daily., Disp: , Rfl:     azelastine (ASTELIN) 137 mcg (0.1 %) nasal spray, 1 spray (137 mcg total) by Nasal route 2 (two) times daily., Disp: 90 mL, Rfl: 3    cloNIDine (CATAPRES) 0.1 MG tablet, Take 0.1 mg by mouth daily as needed. , Disp: , Rfl:     cyanocobalamin (VITAMIN B-12) 100 MCG tablet, Take 1 tablet (100 mcg total) by mouth once daily. (Patient not taking: Reported on 1/4/2023), Disp: 30 tablet, Rfl: 11    Review of Systems   Constitutional: Negative for chills, diaphoresis, fever, malaise/fatigue and night sweats.   HENT:  Negative for congestion and sore throat.    Eyes:  Positive for visual disturbance. Negative for pain.   Cardiovascular:  Negative for chest pain, claudication, cyanosis, dyspnea on exertion (MODERATE), irregular heartbeat, leg swelling (OCC), near-syncope, orthopnea, palpitations, paroxysmal nocturnal dyspnea and syncope.   Respiratory:  Negative for hemoptysis and wheezing.    Hematologic/Lymphatic: Negative for adenopathy. Does not bruise/bleed easily.   Skin:  Negative for color change and rash.   Musculoskeletal:  Negative for back pain and falls.   Gastrointestinal:  Negative for abdominal pain, change in bowel habit,  "dysphagia, jaundice, melena and nausea.   Genitourinary:  Negative for dysuria and flank pain.   Neurological:  Positive for loss of balance (WORSE) and tremors. Negative for brief paralysis, dizziness (SOME ORTHOSTASIS), focal weakness and light-headedness.   Psychiatric/Behavioral:  Negative for altered mental status, depression and memory loss (FAIR).    Allergic/Immunologic: Negative for hives and persistent infections.      Objective:      Vitals:    01/04/23 1509   BP: (!) 90/53   Pulse: 70   Weight: 61.5 kg (135 lb 9.3 oz)   Height: 5' 9" (1.753 m)   PainSc: 0-No pain     Body mass index is 20.02 kg/m².    Physical Exam  Constitutional:       Appearance: He is well-developed.   HENT:      Head: Normocephalic and atraumatic.   Eyes:      Extraocular Movements: Extraocular movements intact.      Conjunctiva/sclera: Conjunctivae normal.   Neck:      Vascular: Normal carotid pulses. No carotid bruit or JVD.   Cardiovascular:      Rate and Rhythm: Normal rate and regular rhythm. No extrasystoles are present.     Pulses:           Carotid pulses are 2+ on the right side and 2+ on the left side.       Radial pulses are 2+ on the right side and 2+ on the left side.        Posterior tibial pulses are 2+ on the right side and 2+ on the left side.      Heart sounds: Murmur heard.   Systolic murmur is present with a grade of 1/6 at the lower left sternal border and apex.     No friction rub. No gallop. No S4 sounds.   Pulmonary:      Effort: Pulmonary effort is normal. No respiratory distress.      Breath sounds: No rales.   Abdominal:      Palpations: Abdomen is soft. There is no hepatomegaly.      Tenderness: There is no abdominal tenderness.   Musculoskeletal:         General: Normal range of motion.      Cervical back: Neck supple.      Right lower leg: No edema.      Left lower leg: No edema.      Comments:  USES A WALKER, STEPPAGE   Skin:     General: Skin is warm and dry.      Capillary Refill: Capillary refill " takes less than 2 seconds.   Neurological:      Mental Status: He is alert and oriented to person, place, and time.      Motor: Tremor present.   Psychiatric:         Mood and Affect: Mood normal.         Speech: Speech is delayed.         Behavior: Behavior normal.             ..    Chemistry        Component Value Date/Time     01/03/2023 1155    K 4.1 01/03/2023 1155    CL 99 01/03/2023 1155    CO2 22 01/03/2023 1155    BUN 36 (H) 01/03/2023 1155    CREATININE 1.94 (H) 01/03/2023 1155     (H) 01/03/2023 1155        Component Value Date/Time    CALCIUM 9.0 01/03/2023 1155    ALKPHOS 80 06/09/2022 1037    AST 15 01/03/2023 1155    ALT 7 01/03/2023 1155    BILITOT 0.4 01/03/2023 1155    ESTGFRAFRICA 43.1 (A) 06/09/2022 1037    EGFRNONAA 37.3 (A) 06/09/2022 1037            ..  Lab Results   Component Value Date    CHOL 115 (L) 06/09/2022    CHOL 157 12/17/2021     Lab Results   Component Value Date    HDL 41 06/09/2022    HDL 43 12/17/2021     Lab Results   Component Value Date    LDLCALC 57.6 (L) 06/09/2022    LDLCALC 95 12/17/2021     Lab Results   Component Value Date    TRIG 82 06/09/2022    TRIG 101 12/17/2021     Lab Results   Component Value Date    CHOLHDL 35.7 06/09/2022     ..  Lab Results   Component Value Date    WBC 6.14 10/04/2018    HGB 13.5 01/03/2023    HCT 40.7 10/04/2018    MCV 92 10/04/2018     10/04/2018       Test(s) Reviewed  I have reviewed the following in detail:  [] Stress test   [] Angiography   [] Echocardiogram   [x] Labs   [] Other:       Assessment:         ICD-10-CM ICD-9-CM   1. Coronary artery disease of native artery of native heart with stable angina pectoris  I25.118 414.01     413.9   2. Hypotension due to drugs  I95.2 458.8     E947.9   3. Non-rheumatic mitral regurgitation  I34.0 424.0   4. Stage 3b chronic kidney disease  N18.32 585.3   5. Hypercholesterolemia  E78.00 272.0     Problem List Items Addressed This Visit          Cardiac/Vascular     Non-rheumatic mitral regurgitation    Hypercholesterolemia    Relevant Orders    Comprehensive Metabolic Panel    Lipid Panel    Coronary artery disease of native artery of native heart with stable angina pectoris - Primary    Relevant Orders    Comprehensive Metabolic Panel    Lipid Panel    Hemoglobin    Hypotension due to drugs       Renal/    Stage 3 chronic kidney disease        Plan:     DC HYDRARALZINE AND WATCH BP, MIGHT NEED TO DECREASE CALCIUM CHANNEL BLOCKER PATIENT IS CURRENTLY ASYMPTOMATIC WITH IT, CLASS 1 ANGINA NO OVERT HEART FAILURE NO TIA TYPE SYMPTOMS NO NEAR-SYNCOPE DIET EXERCISE STAY ACTIVE AS MUCH AS POSSIBLE, STAY WELL HYDRATED, INCREASED CV RISK WITH CKD, RETURN TO CLINIC IN 6 MO WITH LABS      Coronary artery disease of native artery of native heart with stable angina pectoris  -     Comprehensive Metabolic Panel; Future; Expected date: 07/04/2023  -     Lipid Panel; Future; Expected date: 07/04/2023  -     Hemoglobin; Future; Expected date: 07/04/2023    Hypotension due to drugs  Comments:  ADJUST MEDS    Non-rheumatic mitral regurgitation    Stage 3b chronic kidney disease    Hypercholesterolemia  -     Comprehensive Metabolic Panel; Future; Expected date: 07/04/2023  -     Lipid Panel; Future; Expected date: 07/04/2023    RTC Low level/low impact aerobic exercise 5x's/wk. Heart healthy diet and risk factor modification.    See labs and med orders.    Aerobic exercise 5x's/wk. Heart healthy diet and risk factor modification.    See labs and med orders.

## 2023-05-05 DIAGNOSIS — G20.A1 PD (PARKINSON'S DISEASE): ICD-10-CM

## 2023-05-05 DIAGNOSIS — G20.A1 PARKINSON'S DISEASE: Chronic | ICD-10-CM

## 2023-05-08 RX ORDER — PRAVASTATIN SODIUM 80 MG/1
TABLET ORAL
Qty: 90 TABLET | Refills: 0 | Status: SHIPPED | OUTPATIENT
Start: 2023-05-08 | End: 2023-06-28 | Stop reason: DRUGHIGH

## 2023-05-10 RX ORDER — BENZTROPINE MESYLATE 0.5 MG/1
TABLET ORAL
Qty: 180 TABLET | Refills: 3 | OUTPATIENT
Start: 2023-05-10

## 2023-05-23 DIAGNOSIS — G20.A1 PD (PARKINSON'S DISEASE): ICD-10-CM

## 2023-05-23 RX ORDER — AMANTADINE HYDROCHLORIDE 50 MG/5ML
SOLUTION ORAL
Qty: 900 ML | Refills: 0 | Status: SHIPPED | OUTPATIENT
Start: 2023-05-23 | End: 2023-10-16

## 2023-06-20 ENCOUNTER — LAB VISIT (OUTPATIENT)
Dept: PRIMARY CARE CLINIC | Facility: CLINIC | Age: 73
End: 2023-06-20
Payer: MEDICARE

## 2023-06-20 DIAGNOSIS — I25.118 CORONARY ARTERY DISEASE OF NATIVE ARTERY OF NATIVE HEART WITH STABLE ANGINA PECTORIS: Chronic | ICD-10-CM

## 2023-06-20 DIAGNOSIS — E78.00 HYPERCHOLESTEROLEMIA: Chronic | ICD-10-CM

## 2023-06-20 LAB
ALBUMIN SERPL BCP-MCNC: 4.1 G/DL (ref 3.5–5.2)
ALP SERPL-CCNC: 80 U/L (ref 55–135)
ALT SERPL W/O P-5'-P-CCNC: 12 U/L (ref 10–44)
ANION GAP SERPL CALC-SCNC: 13 MMOL/L (ref 8–16)
AST SERPL-CCNC: 23 U/L (ref 10–40)
BILIRUB SERPL-MCNC: 0.6 MG/DL (ref 0.1–1)
BUN SERPL-MCNC: 42 MG/DL (ref 8–23)
CALCIUM SERPL-MCNC: 9.6 MG/DL (ref 8.7–10.5)
CHLORIDE SERPL-SCNC: 106 MMOL/L (ref 95–110)
CHOLEST SERPL-MCNC: 129 MG/DL (ref 120–199)
CHOLEST/HDLC SERPL: 3.7 {RATIO} (ref 2–5)
CO2 SERPL-SCNC: 24 MMOL/L (ref 23–29)
CREAT SERPL-MCNC: 2 MG/DL (ref 0.5–1.4)
EST. GFR  (NO RACE VARIABLE): 35 ML/MIN/1.73 M^2
GLUCOSE SERPL-MCNC: 101 MG/DL (ref 70–110)
HDLC SERPL-MCNC: 35 MG/DL (ref 40–75)
HDLC SERPL: 27.1 % (ref 20–50)
HGB BLD-MCNC: 12.8 G/DL (ref 14–18)
LDLC SERPL CALC-MCNC: 85.8 MG/DL (ref 63–159)
NONHDLC SERPL-MCNC: 94 MG/DL
POTASSIUM SERPL-SCNC: 5.1 MMOL/L (ref 3.5–5.1)
PROT SERPL-MCNC: 7.4 G/DL (ref 6–8.4)
SODIUM SERPL-SCNC: 143 MMOL/L (ref 136–145)
TRIGL SERPL-MCNC: 41 MG/DL (ref 30–150)

## 2023-06-20 PROCEDURE — 80061 LIPID PANEL: CPT | Performed by: INTERNAL MEDICINE

## 2023-06-20 PROCEDURE — 80053 COMPREHEN METABOLIC PANEL: CPT | Performed by: INTERNAL MEDICINE

## 2023-06-20 PROCEDURE — 36415 COLL VENOUS BLD VENIPUNCTURE: CPT | Mod: S$GLB,,, | Performed by: INTERNAL MEDICINE

## 2023-06-20 PROCEDURE — 85018 HEMOGLOBIN: CPT | Performed by: INTERNAL MEDICINE

## 2023-06-20 PROCEDURE — 36415 PR COLLECTION VENOUS BLOOD,VENIPUNCTURE: ICD-10-PCS | Mod: S$GLB,,, | Performed by: INTERNAL MEDICINE

## 2023-06-20 NOTE — PROGRESS NOTES
Venipuncture performed with 23 gauge butterfly, x's 1 attempt.  Successful venipuncture to L Antecubital vein.  Specimens collected per orders.      Pressure dressing applied to site, instructed patient to remove dressing in 10-15 minutes, OK to re-adjust dressing if pressure causing any discomfort, to observe closely for numbness and/or discoloration to hand or fingers, and to notify provider if bleeding persists after applying constant pressure lasting 30 minutes.

## 2023-06-28 ENCOUNTER — OFFICE VISIT (OUTPATIENT)
Dept: CARDIOLOGY | Facility: CLINIC | Age: 73
End: 2023-06-28
Payer: MEDICARE

## 2023-06-28 VITALS
SYSTOLIC BLOOD PRESSURE: 138 MMHG | DIASTOLIC BLOOD PRESSURE: 84 MMHG | HEIGHT: 69 IN | WEIGHT: 125.44 LBS | BODY MASS INDEX: 18.58 KG/M2 | HEART RATE: 71 BPM

## 2023-06-28 DIAGNOSIS — I08.0 MITRAL AND AORTIC REGURGITATION: Chronic | ICD-10-CM

## 2023-06-28 DIAGNOSIS — I10 ESSENTIAL HYPERTENSION: Chronic | ICD-10-CM

## 2023-06-28 DIAGNOSIS — I25.118 CORONARY ARTERY DISEASE OF NATIVE ARTERY OF NATIVE HEART WITH STABLE ANGINA PECTORIS: Primary | Chronic | ICD-10-CM

## 2023-06-28 DIAGNOSIS — N18.32 STAGE 3B CHRONIC KIDNEY DISEASE: Chronic | ICD-10-CM

## 2023-06-28 DIAGNOSIS — E78.00 HYPERCHOLESTEROLEMIA: Chronic | ICD-10-CM

## 2023-06-28 PROCEDURE — 99214 OFFICE O/P EST MOD 30 MIN: CPT | Mod: S$GLB,,, | Performed by: INTERNAL MEDICINE

## 2023-06-28 PROCEDURE — 99214 PR OFFICE/OUTPT VISIT, EST, LEVL IV, 30-39 MIN: ICD-10-PCS | Mod: S$GLB,,, | Performed by: INTERNAL MEDICINE

## 2023-06-28 RX ORDER — PRAVASTATIN SODIUM 40 MG/1
80 TABLET ORAL NIGHTLY
Qty: 180 TABLET | Refills: 1 | Status: SHIPPED | OUTPATIENT
Start: 2023-06-28 | End: 2023-09-27 | Stop reason: SDUPTHER

## 2023-06-28 RX ORDER — LOSARTAN POTASSIUM 50 MG/1
50 TABLET ORAL DAILY
COMMUNITY

## 2023-06-28 NOTE — PROGRESS NOTES
Subjective:    Patient ID:  Yannick Dorsey is a 73 y.o. male who presents for No chief complaint on file.        HPI  DISCUSSED LABS AND GOALS, CMP GFR 35, LDL 85 UP, CHOKES ON BIG PILL, HB 12.8, WORSENING PARKINSON BLOOD PRESSURE BORDERLINE NO CHEST PAIN MILD DISTANT EXERTION NO TIA TYPE SYMPTOMS NO NEAR-SYNCOPE DIFFICULT MOBILITY WITH WORSENING PARKINSON'S, SEE REVIEW OF SYSTEM    Past Medical History:   Diagnosis Date    Adenoma of cecum 7/24/2018    Broken fibula 2/23/2015    left calf    Broken tibia 2/23/2015    left calve    Cancer     SKIN    Compulsive gambling 7/31/2012    COPD (chronic obstructive pulmonary disease)     Coronary artery disease     Heart murmur     HTN (hypertension)     Hyperlipidemia     Kidney disease, chronic, stage III (GFR 30-59 ml/min)     PD (Parkinson's disease) 2009    left tremor-dominant    Polyp of colon     Stroke 1998    Tic 1994    right shoulder     Past Surgical History:   Procedure Laterality Date    ANGIOGRAPHY OF ABDOMEN N/A 9/27/2018    Procedure: Angiogram, Abdomen;  Surgeon: Jose Martin Jama MD;  Location: [facility] CATH;  Service: Cardiology;  Laterality: N/A;    CARDIAC CATHETERIZATION      COLECTOMY Right 7/24/2018    Procedure: COLECTOMY;  Surgeon: Farshad Coles MD;  Location: [facility] OR;  Service: Colon and Rectal;  Laterality: Right;    COLONOSCOPY N/A 6/1/2018    Procedure: COLONOSCOPY;  Surgeon: Ross Leyva MD;  Location: [facility] ENDO;  Service: Endoscopy;  Laterality: N/A;    COLONOSCOPY N/A 12/13/2021    Procedure: COLONOSCOPY;  Surgeon: Ross Leyva MD;  Location: [facility] ENDO;  Service: Endoscopy;  Laterality: N/A;    CORONARY STENT PLACEMENT N/A 10/4/2018    Procedure: INSERTION, STENT, CORONARY ARTERY;  Surgeon: Jose Martin Jama MD;  Location: [facility] CATH;  Service: Cardiology;  Laterality: N/A;    JOINT REPLACEMENT Bilateral     knees    LEFT HEART CATHETERIZATION N/A 9/27/2018    Procedure: Left heart cath;  Surgeon: Jose Martin Jama MD;  Location:  [facility] CATH;  Service: Cardiology;  Laterality: N/A;    LEFT HEART CATHETERIZATION Left 10/4/2018    Procedure: Left heart cath;  Surgeon: Jose Martin Jama MD;  Location: [facility] CATH;  Service: Cardiology;  Laterality: Left;    LEG SURGERY Left     PARATHYROIDECTOMY      PROSTATE SURGERY      ROBOT-ASSISTED LAPAROSCOPIC COLECTOMY USING DA SHERRI XI Right 7/24/2018    Procedure: XI ROBOTIC COLECTOMY;  Surgeon: Farshad Coles MD;  Location: [facility] OR;  Service: Colon and Rectal;  Laterality: Right;     Family History   Problem Relation Age of Onset    Tics Brother     Cancer Brother     Cancer Mother     COPD Mother     Stroke Father      Social History     Socioeconomic History    Marital status:    Tobacco Use    Smoking status: Never    Smokeless tobacco: Never   Substance and Sexual Activity    Alcohol use: No    Drug use: No       Review of patient's allergies indicates:   Allergen Reactions    Plavix [clopidogrel] Rash    Penicillins Hives     Other reaction(s): Unknown  Other reaction(s): Unknown    Pramipexole Other (See Comments)     Compulsive gambling at 0.5mg tid       Current Outpatient Medications:     albuterol (PROAIR HFA) 90 mcg/actuation inhaler, Inhale 2 puffs into the lungs every 6 (six) hours as needed for Wheezing., Disp: 8 g, Rfl: 6    albuterol (PROVENTIL) 5 mg/mL nebulizer solution, Take 0.5 mLs (2.5 mg total) by nebulization every 6 (six) hours as needed for Wheezing. Rescue, Disp: 90 vial, Rfl: 6    amantadine HCL (SYMMETREL) 50 mg/5 mL Soln, TAKE 10 ML BY MOUTH DAILY, Disp: 900 mL, Rfl: 0    amLODIPine (NORVASC) 2.5 MG tablet, TAKE 1 TABLET BY MOUTH IN  THE EVENING, Disp: 90 tablet, Rfl: 1    ascorbic acid, vitamin C, (VITAMIN C) 1000 MG tablet, Take 1,000 mg by mouth., Disp: , Rfl:     aspirin (ECOTRIN) 81 MG EC tablet, Take 81 mg by mouth once daily., Disp: , Rfl:     benztropine (COGENTIN) 0.5 MG tablet, TAKE 1 TABLET BY MOUTH  TWICE DAILY, Disp: 180 tablet, Rfl: 3    bisacodyL (DULCOLAX)  5 mg EC tablet, Take by mouth., Disp: , Rfl:     carbidopa-levodopa  mg (SINEMET CR)  mg TbSR, Take 1 tablet by mouth 3 (three) times daily., Disp: 270 tablet, Rfl: 0    cloNIDine (CATAPRES) 0.1 MG tablet, Take 0.1 mg by mouth daily as needed. , Disp: , Rfl:     cyanocobalamin (VITAMIN B-12) 100 MCG tablet, Take 1 tablet (100 mcg total) by mouth once daily., Disp: 30 tablet, Rfl: 11    ezetimibe (ZETIA) 10 mg tablet, TAKE 1 TABLET BY MOUTH ONCE DAILY, Disp: 90 tablet, Rfl: 1    ferrous sulfate 324 mg (65 mg iron) TbEC, Take 325 mg by mouth once daily., Disp: , Rfl:     losartan (COZAAR) 50 MG tablet, Take 50 mg by mouth once daily., Disp: , Rfl:     polyethylene glycol (GLYCOLAX) 17 gram/dose powder, Take by mouth., Disp: , Rfl:     vitamin D 1000 units Tab, Take 1,000 Units by mouth once daily., Disp: , Rfl:     azelastine (ASTELIN) 137 mcg (0.1 %) nasal spray, 1 spray (137 mcg total) by Nasal route 2 (two) times daily., Disp: 90 mL, Rfl: 3    fluticasone-salmeterol diskus inhaler 250-50 mcg, Inhale 1 puff into the lungs 2 (two) times a day. Controller, Disp: 180 each, Rfl: 3    nitroGLYCERIN (NITROSTAT) 0.4 MG SL tablet, Place 1 tablet (0.4 mg total) under the tongue every 5 (five) minutes as needed., Disp: 25 tablet, Rfl: 0    pravastatin (PRAVACHOL) 40 MG tablet, Take 2 tablets (80 mg total) by mouth every evening., Disp: 180 tablet, Rfl: 1    Review of Systems   Constitutional: Positive for weight loss. Negative for chills, diaphoresis, fever, malaise/fatigue and night sweats.   HENT:  Negative for congestion and sore throat.    Eyes:  Positive for blurred vision and visual disturbance. Negative for pain.   Cardiovascular:  Negative for chest pain, claudication, cyanosis, dyspnea on exertion (MODERATE), irregular heartbeat, leg swelling, near-syncope, orthopnea, palpitations, paroxysmal nocturnal dyspnea and syncope.   Respiratory:  Negative for hemoptysis and wheezing.    Hematologic/Lymphatic:  "Negative for adenopathy. Does not bruise/bleed easily.   Skin:  Negative for color change and rash.   Musculoskeletal:  Negative for back pain and falls.   Gastrointestinal:  Negative for abdominal pain, change in bowel habit, dysphagia, jaundice, melena and nausea.   Genitourinary:  Negative for dysuria and flank pain.   Neurological:  Positive for loss of balance (PROGRESSIVE) and tremors. Negative for brief paralysis, dizziness (SOME ORTHOSTASIS), focal weakness and light-headedness.   Psychiatric/Behavioral:  Negative for altered mental status, depression and memory loss.    Allergic/Immunologic: Negative for persistent infections.      Objective:      Vitals:    06/28/23 1526   BP: 138/84   Pulse: 71   Weight: 56.9 kg (125 lb 7.1 oz)   Height: 5' 9" (1.753 m)   PainSc: 0-No pain     Body mass index is 18.52 kg/m².    Physical Exam  Constitutional:       Appearance: He is well-developed.      Comments: SKINNY, CHRONICALLY ILL   HENT:      Head: Normocephalic and atraumatic.   Eyes:      Extraocular Movements: Extraocular movements intact.      Pupils: Pupils are equal, round, and reactive to light.   Neck:      Vascular: Normal carotid pulses. No carotid bruit or JVD.   Cardiovascular:      Rate and Rhythm: Normal rate and regular rhythm. No extrasystoles are present.     Pulses:           Carotid pulses are 2+ on the right side and 2+ on the left side.       Radial pulses are 2+ on the right side and 2+ on the left side.        Posterior tibial pulses are 2+ on the right side and 2+ on the left side.      Heart sounds: Murmur heard.   Systolic murmur is present with a grade of 1/6 at the lower left sternal border.   Diastolic murmur is present with a grade of 1/4.     No friction rub. No gallop. No S4 sounds.   Pulmonary:      Effort: Pulmonary effort is normal.      Breath sounds: Normal air entry. No rales.   Abdominal:      Palpations: Abdomen is soft. There is no hepatomegaly.      Tenderness: There is no " abdominal tenderness.   Musculoskeletal:         General: Normal range of motion.      Cervical back: Neck supple.      Right lower leg: No edema.      Left lower leg: No edema.      Comments:  USES A WALKER, STEPPAGE   Skin:     General: Skin is warm and dry.      Capillary Refill: Capillary refill takes less than 2 seconds.   Neurological:      Mental Status: He is alert and oriented to person, place, and time.      Motor: Tremor present.   Psychiatric:         Mood and Affect: Mood normal.         Speech: Speech is tangential.         Behavior: Behavior normal.               ..    Chemistry        Component Value Date/Time     06/20/2023 1015    K 5.1 06/20/2023 1015     06/20/2023 1015    CO2 24 06/20/2023 1015    BUN 42 (H) 06/20/2023 1015    CREATININE 2.0 (H) 06/20/2023 1015     06/20/2023 1015        Component Value Date/Time    CALCIUM 9.6 06/20/2023 1015    ALKPHOS 80 06/20/2023 1015    AST 23 06/20/2023 1015    ALT 12 06/20/2023 1015    BILITOT 0.6 06/20/2023 1015    ESTGFRAFRICA 43.1 (A) 06/09/2022 1037    EGFRNONAA 37.3 (A) 06/09/2022 1037            ..  Lab Results   Component Value Date    CHOL 129 06/20/2023    CHOL 115 (L) 06/09/2022    CHOL 157 12/17/2021     Lab Results   Component Value Date    HDL 35 (L) 06/20/2023    HDL 41 06/09/2022    HDL 43 12/17/2021     Lab Results   Component Value Date    LDLCALC 85.8 06/20/2023    LDLCALC 57.6 (L) 06/09/2022    LDLCALC 95 12/17/2021     Lab Results   Component Value Date    TRIG 41 06/20/2023    TRIG 82 06/09/2022    TRIG 101 12/17/2021     Lab Results   Component Value Date    CHOLHDL 27.1 06/20/2023    CHOLHDL 35.7 06/09/2022     ..  Lab Results   Component Value Date    WBC 6.14 10/04/2018    HGB 12.8 (L) 06/20/2023    HCT 40.7 10/04/2018    MCV 92 10/04/2018     10/04/2018       Test(s) Reviewed  I have reviewed the following in detail:  [] Stress test   [] Angiography   [] Echocardiogram   [x] Labs   [] Other:        Assessment:         ICD-10-CM ICD-9-CM   1. Coronary artery disease of native artery of native heart with stable angina pectoris  I25.118 414.01     413.9   2. Mitral and aortic regurgitation  I08.0 396.3   3. Hypercholesterolemia  E78.00 272.0   4. Stage 3b chronic kidney disease  N18.32 585.3   5. Essential hypertension  I10 401.9   6. Body mass index (BMI) less than 19  Z68.1 V85.0     Problem List Items Addressed This Visit          Cardiac/Vascular    Mitral and aortic regurgitation    Hypercholesterolemia    Relevant Orders    Comprehensive Metabolic Panel    Essential hypertension    Relevant Orders    Comprehensive Metabolic Panel    Coronary artery disease of native artery of native heart with stable angina pectoris - Primary    Relevant Orders    Comprehensive Metabolic Panel       Renal/    Stage 3 chronic kidney disease    Relevant Orders    Comprehensive Metabolic Panel       Endocrine    Body mass index (BMI) less than 19        Plan:         CHANGE PRAVACHOL TO 40 2/D, BECAUSE THE PATIENT IS UNABLE TO SWALLOW THE LARGE PILL OF 80 MG HIS LDL WAS WELL WHEN HE WAS TAKING 80 MG, CLASS 1 ANGINA NO OVERT HEART FAILURE NO TIA SYMPTOMS NO NEAR-SYNCOPE NO ARRHYTHMIA DIET EXERCISE AS MUCH AS POSSIBLE, INCREASED CV RISK WITH SIGNIFICANT CKD, RETURN TO CLINIC IN 6 MO WITH LABS  Coronary artery disease of native artery of native heart with stable angina pectoris  -     Comprehensive Metabolic Panel; Future; Expected date: 12/28/2023    Mitral and aortic regurgitation    Hypercholesterolemia  -     Comprehensive Metabolic Panel; Future; Expected date: 12/28/2023    Stage 3b chronic kidney disease  -     Comprehensive Metabolic Panel; Future; Expected date: 12/28/2023    Essential hypertension  Comments:  MONITOR  Orders:  -     Comprehensive Metabolic Panel; Future; Expected date: 12/28/2023    Body mass index (BMI) less than 19    Other orders  -     pravastatin (PRAVACHOL) 40 MG tablet; Take 2 tablets (80  mg total) by mouth every evening.  Dispense: 180 tablet; Refill: 1    RTC Low level/low impact aerobic exercise 5x's/wk. Heart healthy diet and risk factor modification.    See labs and med orders.    Aerobic exercise 5x's/wk. Heart healthy diet and risk factor modification.    See labs and med orders.

## 2023-07-07 NOTE — TELEPHONE ENCOUNTER
Name from pharmacy: BENZTROPINE  0.5MG  TAB        Will file in chart as: benztropine (COGENTIN) 0.5 MG tablet   Sig: TAKE 1 TABLET BY MOUTH  TWICE DAILY   Disp:  180 tablet    Refills:  3   Start: 5/10/2023   Class: Normal   Refused by: Ngozi Julian NP   Refusal reason: Patient needs an appointment      Fill requested from: [facility] - [facility] [address] [place] [address]

## 2023-07-26 DIAGNOSIS — G20.A1 PD (PARKINSON'S DISEASE): ICD-10-CM

## 2023-07-26 RX ORDER — BENZTROPINE MESYLATE 0.5 MG/1
TABLET ORAL
Qty: 180 TABLET | Refills: 3 | Status: SHIPPED | OUTPATIENT
Start: 2023-07-26

## 2023-08-29 DIAGNOSIS — E78.00 HYPERCHOLESTEROLEMIA: ICD-10-CM

## 2023-08-29 DIAGNOSIS — I10 HYPERTENSION, UNSPECIFIED TYPE: ICD-10-CM

## 2023-08-29 RX ORDER — EZETIMIBE 10 MG/1
TABLET ORAL
Qty: 90 TABLET | Refills: 1 | Status: SHIPPED | OUTPATIENT
Start: 2023-08-29 | End: 2024-01-17 | Stop reason: SDUPTHER

## 2023-08-29 RX ORDER — AMLODIPINE BESYLATE 2.5 MG/1
TABLET ORAL
Qty: 90 TABLET | Refills: 1 | Status: SHIPPED | OUTPATIENT
Start: 2023-08-29 | End: 2024-01-17 | Stop reason: SDUPTHER

## 2023-09-27 RX ORDER — PRAVASTATIN SODIUM 40 MG/1
80 TABLET ORAL NIGHTLY
Qty: 180 TABLET | Refills: 1 | Status: SHIPPED | OUTPATIENT
Start: 2023-09-27 | End: 2023-10-04 | Stop reason: SDUPTHER

## 2023-10-04 RX ORDER — PRAVASTATIN SODIUM 40 MG/1
80 TABLET ORAL NIGHTLY
Qty: 180 TABLET | Refills: 1 | Status: SHIPPED | OUTPATIENT
Start: 2023-10-04 | End: 2024-10-03

## 2023-10-13 DIAGNOSIS — G20.A1 PD (PARKINSON'S DISEASE): ICD-10-CM

## 2023-10-16 RX ORDER — AMANTADINE HYDROCHLORIDE 50 MG/5ML
SOLUTION ORAL
Qty: 900 ML | Refills: 3 | Status: SHIPPED | OUTPATIENT
Start: 2023-10-16

## 2023-12-01 ENCOUNTER — TELEPHONE (OUTPATIENT)
Dept: CARDIOLOGY | Facility: CLINIC | Age: 73
End: 2023-12-01
Payer: MEDICARE

## 2023-12-28 ENCOUNTER — LAB VISIT (OUTPATIENT)
Dept: PRIMARY CARE CLINIC | Facility: CLINIC | Age: 73
End: 2023-12-28
Payer: MEDICARE

## 2023-12-28 DIAGNOSIS — N18.32 STAGE 3B CHRONIC KIDNEY DISEASE: Chronic | ICD-10-CM

## 2023-12-28 DIAGNOSIS — I25.118 CORONARY ARTERY DISEASE OF NATIVE ARTERY OF NATIVE HEART WITH STABLE ANGINA PECTORIS: Chronic | ICD-10-CM

## 2023-12-28 DIAGNOSIS — E78.00 HYPERCHOLESTEROLEMIA: Chronic | ICD-10-CM

## 2023-12-28 DIAGNOSIS — I10 ESSENTIAL HYPERTENSION: Chronic | ICD-10-CM

## 2023-12-28 LAB
ALBUMIN SERPL BCP-MCNC: 3.8 G/DL (ref 3.5–5.2)
ALP SERPL-CCNC: 99 U/L (ref 55–135)
ALT SERPL W/O P-5'-P-CCNC: 6 U/L (ref 10–44)
ANION GAP SERPL CALC-SCNC: 12 MMOL/L (ref 8–16)
AST SERPL-CCNC: 15 U/L (ref 10–40)
BILIRUB SERPL-MCNC: 0.5 MG/DL (ref 0.1–1)
BUN SERPL-MCNC: 35 MG/DL (ref 8–23)
CALCIUM SERPL-MCNC: 9.2 MG/DL (ref 8.7–10.5)
CHLORIDE SERPL-SCNC: 102 MMOL/L (ref 95–110)
CO2 SERPL-SCNC: 26 MMOL/L (ref 23–29)
CREAT SERPL-MCNC: 1.9 MG/DL (ref 0.5–1.4)
EST. GFR  (NO RACE VARIABLE): 37.2 ML/MIN/1.73 M^2
GLUCOSE SERPL-MCNC: 110 MG/DL (ref 70–110)
POTASSIUM SERPL-SCNC: 4.4 MMOL/L (ref 3.5–5.1)
PROT SERPL-MCNC: 7.4 G/DL (ref 6–8.4)
SODIUM SERPL-SCNC: 140 MMOL/L (ref 136–145)

## 2023-12-28 PROCEDURE — 36415 COLL VENOUS BLD VENIPUNCTURE: CPT | Mod: S$GLB,,, | Performed by: INTERNAL MEDICINE

## 2023-12-28 PROCEDURE — 36415 PR COLLECTION VENOUS BLOOD,VENIPUNCTURE: ICD-10-PCS | Mod: S$GLB,,, | Performed by: INTERNAL MEDICINE

## 2023-12-28 PROCEDURE — 80053 COMPREHEN METABOLIC PANEL: CPT | Performed by: INTERNAL MEDICINE

## 2024-01-17 ENCOUNTER — OFFICE VISIT (OUTPATIENT)
Dept: CARDIOLOGY | Facility: CLINIC | Age: 74
End: 2024-01-17
Payer: MEDICARE

## 2024-01-17 VITALS
SYSTOLIC BLOOD PRESSURE: 107 MMHG | BODY MASS INDEX: 19.86 KG/M2 | WEIGHT: 134.06 LBS | HEIGHT: 69 IN | DIASTOLIC BLOOD PRESSURE: 58 MMHG | HEART RATE: 70 BPM

## 2024-01-17 DIAGNOSIS — I08.0 MITRAL AND AORTIC REGURGITATION: Chronic | ICD-10-CM

## 2024-01-17 DIAGNOSIS — E78.00 HYPERCHOLESTEROLEMIA: Chronic | ICD-10-CM

## 2024-01-17 DIAGNOSIS — Z91.81 AT HIGH RISK FOR FALLS: ICD-10-CM

## 2024-01-17 DIAGNOSIS — Z79.82 LONG TERM (CURRENT) USE OF ASPIRIN: Chronic | ICD-10-CM

## 2024-01-17 DIAGNOSIS — I25.118 CORONARY ARTERY DISEASE OF NATIVE ARTERY OF NATIVE HEART WITH STABLE ANGINA PECTORIS: Primary | Chronic | ICD-10-CM

## 2024-01-17 DIAGNOSIS — I10 HYPERTENSION, UNSPECIFIED TYPE: Chronic | ICD-10-CM

## 2024-01-17 DIAGNOSIS — J45.30 MILD PERSISTENT ASTHMA WITHOUT COMPLICATION: ICD-10-CM

## 2024-01-17 DIAGNOSIS — N18.32 STAGE 3B CHRONIC KIDNEY DISEASE: Chronic | ICD-10-CM

## 2024-01-17 PROCEDURE — 99214 OFFICE O/P EST MOD 30 MIN: CPT | Mod: S$GLB,,, | Performed by: INTERNAL MEDICINE

## 2024-01-17 RX ORDER — EZETIMIBE 10 MG/1
10 TABLET ORAL DAILY
Qty: 90 TABLET | Refills: 1 | Status: SHIPPED | OUTPATIENT
Start: 2024-01-17

## 2024-01-17 RX ORDER — AMLODIPINE BESYLATE 2.5 MG/1
2.5 TABLET ORAL NIGHTLY
Qty: 90 TABLET | Refills: 1 | Status: SHIPPED | OUTPATIENT
Start: 2024-01-17

## 2024-01-17 NOTE — PROGRESS NOTES
Subjective:    Patient ID:  Yannick Dorsey is a 74 y.o. male who presents for Coronary artery disease of native artery of native heart wi        HPI  DISCUSSED LABS AND GOALS CMP WITH GFR OF 37 UP FROM 35, FALLS, WORSEN ING BALANCE WITH PARKINSON'S, WORSENING SPEECH, SEE ROS    Past Medical History:   Diagnosis Date    Adenoma of cecum 7/24/2018    Broken fibula 2/23/2015    left calf    Broken tibia 2/23/2015    left calve    Cancer     SKIN    Compulsive gambling 7/31/2012    COPD (chronic obstructive pulmonary disease)     Coronary artery disease     Heart murmur     HTN (hypertension)     Hyperlipidemia     Kidney disease, chronic, stage III (GFR 30-59 ml/min)     PD (Parkinson's disease) 2009    left tremor-dominant    Polyp of colon     Stroke 1998    Tic 1994    right shoulder     Past Surgical History:   Procedure Laterality Date    ANGIOGRAPHY OF ABDOMEN N/A 9/27/2018    Procedure: Angiogram, Abdomen;  Surgeon: Jose Martin Jama MD;  Location: [facility] CATH;  Service: Cardiology;  Laterality: N/A;    CARDIAC CATHETERIZATION      COLECTOMY Right 7/24/2018    Procedure: COLECTOMY;  Surgeon: Farshad Coles MD;  Location: [facility] OR;  Service: Colon and Rectal;  Laterality: Right;    COLONOSCOPY N/A 6/1/2018    Procedure: COLONOSCOPY;  Surgeon: Ross Leyva MD;  Location: [facility] ENDO;  Service: Endoscopy;  Laterality: N/A;    COLONOSCOPY N/A 12/13/2021    Procedure: COLONOSCOPY;  Surgeon: Ross Leyva MD;  Location: [facility] ENDO;  Service: Endoscopy;  Laterality: N/A;    CORONARY STENT PLACEMENT N/A 10/4/2018    Procedure: INSERTION, STENT, CORONARY ARTERY;  Surgeon: Jose Martin Jama MD;  Location: [facility] CATH;  Service: Cardiology;  Laterality: N/A;    JOINT REPLACEMENT Bilateral     knees    LEFT HEART CATHETERIZATION N/A 9/27/2018    Procedure: Left heart cath;  Surgeon: Jose Martin Jama MD;  Location: [facility] CATH;  Service: Cardiology;  Laterality: N/A;    LEFT HEART CATHETERIZATION Left 10/4/2018    Procedure:  Left heart cath;  Surgeon: Jose Martin Jama MD;  Location: [facility] CATH;  Service: Cardiology;  Laterality: Left;    LEG SURGERY Left     PARATHYROIDECTOMY      PROSTATE SURGERY      ROBOT-ASSISTED LAPAROSCOPIC COLECTOMY USING DA SHERRI XI Right 7/24/2018    Procedure: XI ROBOTIC COLECTOMY;  Surgeon: Farshad Coles MD;  Location: [facility] OR;  Service: Colon and Rectal;  Laterality: Right;     Family History   Problem Relation Age of Onset    Tics Brother     Cancer Brother     Cancer Mother     COPD Mother     Stroke Father      Social History     Socioeconomic History    Marital status:    Tobacco Use    Smoking status: Never    Smokeless tobacco: Never   Substance and Sexual Activity    Alcohol use: No    Drug use: No       Review of patient's allergies indicates:   Allergen Reactions    Plavix [clopidogrel] Rash    Penicillins Hives     Other reaction(s): Unknown  Other reaction(s): Unknown    Pramipexole Other (See Comments)     Compulsive gambling at 0.5mg tid       Current Outpatient Medications:     albuterol (PROAIR HFA) 90 mcg/actuation inhaler, Inhale 2 puffs into the lungs every 6 (six) hours as needed for Wheezing., Disp: 8 g, Rfl: 6    albuterol (PROVENTIL) 5 mg/mL nebulizer solution, Take 0.5 mLs (2.5 mg total) by nebulization every 6 (six) hours as needed for Wheezing. Rescue, Disp: 90 vial, Rfl: 6    amantadine HCL (SYMMETREL) 50 mg/5 mL Soln, TAKE 10 ML BY MOUTH DAILY, Disp: 900 mL, Rfl: 3    ascorbic acid, vitamin C, (VITAMIN C) 1000 MG tablet, Take 1,000 mg by mouth., Disp: , Rfl:     aspirin (ECOTRIN) 81 MG EC tablet, Take 81 mg by mouth once daily., Disp: , Rfl:     benztropine (COGENTIN) 0.5 MG tablet, TAKE 1 TABLET BY MOUTH  TWICE DAILY, Disp: 180 tablet, Rfl: 3    bisacodyL (DULCOLAX) 5 mg EC tablet, Take by mouth., Disp: , Rfl:     carbidopa-levodopa  mg (SINEMET CR)  mg TbSR, Take 1 tablet by mouth 3 (three) times daily., Disp: 270 tablet, Rfl: 0    cyanocobalamin (VITAMIN B-12)  100 MCG tablet, Take 1 tablet (100 mcg total) by mouth once daily., Disp: 30 tablet, Rfl: 11    losartan (COZAAR) 50 MG tablet, Take 50 mg by mouth once daily., Disp: , Rfl:     polyethylene glycol (GLYCOLAX) 17 gram/dose powder, Take by mouth., Disp: , Rfl:     pravastatin (PRAVACHOL) 40 MG tablet, Take 2 tablets (80 mg total) by mouth every evening., Disp: 180 tablet, Rfl: 1    vitamin D 1000 units Tab, Take 1,000 Units by mouth once daily., Disp: , Rfl:     amLODIPine (NORVASC) 2.5 MG tablet, Take 1 tablet (2.5 mg total) by mouth every evening., Disp: 90 tablet, Rfl: 1    azelastine (ASTELIN) 137 mcg (0.1 %) nasal spray, 1 spray (137 mcg total) by Nasal route 2 (two) times daily., Disp: 90 mL, Rfl: 3    ezetimibe (ZETIA) 10 mg tablet, Take 1 tablet (10 mg total) by mouth once daily., Disp: 90 tablet, Rfl: 1    ferrous sulfate 324 mg (65 mg iron) TbEC, Take 325 mg by mouth once daily., Disp: , Rfl:     fluticasone-salmeterol diskus inhaler 250-50 mcg, Inhale 1 puff into the lungs 2 (two) times a day. Controller, Disp: 180 each, Rfl: 3    nitroGLYCERIN (NITROSTAT) 0.4 MG SL tablet, Place 1 tablet (0.4 mg total) under the tongue every 5 (five) minutes as needed., Disp: 25 tablet, Rfl: 0    Review of Systems   Constitutional: Negative for chills, diaphoresis, fever, malaise/fatigue, night sweats and weight loss.   HENT:  Negative for congestion and sore throat.    Eyes:  Positive for visual disturbance (FUZZY). Negative for pain.   Cardiovascular:  Negative for chest pain, claudication, cyanosis, dyspnea on exertion (MODERATE), irregular heartbeat, leg swelling, near-syncope, orthopnea, palpitations, paroxysmal nocturnal dyspnea and syncope.   Respiratory:  Negative for hemoptysis and wheezing.    Hematologic/Lymphatic: Negative for adenopathy. Does not bruise/bleed easily.   Skin:  Negative for color change and rash.   Musculoskeletal:  Positive for falls. Negative for back pain.   Gastrointestinal:  Negative for  "abdominal pain, change in bowel habit, dysphagia, jaundice, melena and nausea.   Genitourinary:  Negative for dysuria and flank pain.   Neurological:  Positive for loss of balance and tremors. Negative for brief paralysis, dizziness, focal weakness and light-headedness.   Psychiatric/Behavioral:  Negative for altered mental status, depression and memory loss.    Allergic/Immunologic: Negative for persistent infections.        Objective:      Vitals:    01/17/24 0954   BP: (!) 107/58   Pulse: 70   Weight: 60.8 kg (134 lb 0.6 oz)   Height: 5' 9" (1.753 m)   PainSc: 0-No pain     Body mass index is 19.79 kg/m².    Physical Exam  Constitutional:       Appearance: He is well-developed and underweight.      Comments: CHRONICALLY ILL   HENT:      Head: Normocephalic and atraumatic.   Eyes:      Extraocular Movements: Extraocular movements intact.      Pupils: Pupils are equal, round, and reactive to light.   Neck:      Vascular: Normal carotid pulses. No carotid bruit or JVD.   Cardiovascular:      Rate and Rhythm: Normal rate and regular rhythm. No extrasystoles are present.     Pulses:           Carotid pulses are 2+ on the right side and 2+ on the left side.       Radial pulses are 2+ on the right side and 2+ on the left side.        Posterior tibial pulses are 2+ on the right side and 2+ on the left side.      Heart sounds: Murmur heard.      Systolic murmur is present with a grade of 1/6 at the lower left sternal border.      Diastolic murmur is present.      No friction rub. No gallop. No S4 sounds.   Pulmonary:      Effort: Pulmonary effort is normal. No respiratory distress.      Breath sounds: Normal breath sounds. No rales.   Abdominal:      Palpations: Abdomen is soft.      Tenderness: There is no abdominal tenderness.   Musculoskeletal:         General: Normal range of motion.      Cervical back: Neck supple.      Right lower leg: No edema.      Left lower leg: No edema.      Comments:  USES A WALKER, STEPPAGE "   Skin:     General: Skin is warm and dry.      Capillary Refill: Capillary refill takes less than 2 seconds.   Neurological:      Mental Status: He is alert and oriented to person, place, and time.      Motor: Tremor present.   Psychiatric:         Mood and Affect: Mood normal.         Speech: Speech is slurred.         Behavior: Behavior normal.                 ..    Chemistry        Component Value Date/Time     12/28/2023 1113    K 4.4 12/28/2023 1113     12/28/2023 1113    CO2 26 12/28/2023 1113    BUN 35 (H) 12/28/2023 1113    CREATININE 1.9 (H) 12/28/2023 1113     12/28/2023 1113        Component Value Date/Time    CALCIUM 9.2 12/28/2023 1113    ALKPHOS 99 12/28/2023 1113    AST 15 12/28/2023 1113    ALT 6 (L) 12/28/2023 1113    BILITOT 0.5 12/28/2023 1113    ESTGFRAFRICA 43.1 (A) 06/09/2022 1037    EGFRNONAA 37.3 (A) 06/09/2022 1037            ..  Lab Results   Component Value Date    CHOL 129 06/20/2023    CHOL 115 (L) 06/09/2022    CHOL 157 12/17/2021     Lab Results   Component Value Date    HDL 35 (L) 06/20/2023    HDL 41 06/09/2022    HDL 43 12/17/2021     Lab Results   Component Value Date    LDLCALC 85.8 06/20/2023    LDLCALC 57.6 (L) 06/09/2022    LDLCALC 95 12/17/2021     Lab Results   Component Value Date    TRIG 41 06/20/2023    TRIG 82 06/09/2022    TRIG 101 12/17/2021     Lab Results   Component Value Date    CHOLHDL 27.1 06/20/2023    CHOLHDL 35.7 06/09/2022     ..  Lab Results   Component Value Date    WBC 6.14 10/04/2018    HGB 12.8 (L) 06/20/2023    HCT 40.7 10/04/2018    MCV 92 10/04/2018     10/04/2018       Test(s) Reviewed  I have reviewed the following in detail:  [] Stress test   [] Angiography   [] Echocardiogram   [x] Labs   [] Other:       Assessment:         ICD-10-CM ICD-9-CM   1. Coronary artery disease of native artery of native heart with stable angina pectoris  I25.118 414.01     413.9   2. Mitral and aortic regurgitation  I08.0 396.3   3. Stage 3b  chronic kidney disease  N18.32 585.3   4. Hypercholesterolemia  E78.00 272.0   5. At high risk for falls  Z91.81 V15.88   6. Long term (current) use of aspirin  Z79.82 V58.66   7. Mild persistent asthma without complication  J45.30 493.90   8. Hypertension, unspecified type  I10 401.9     Problem List Items Addressed This Visit          Pulmonary    Mild persistent asthma       Cardiac/Vascular    Mitral and aortic regurgitation    Hypercholesterolemia    Relevant Medications    ezetimibe (ZETIA) 10 mg tablet    Other Relevant Orders    Comprehensive Metabolic Panel    Lipid Panel    Coronary artery disease of native artery of native heart with stable angina pectoris - Primary    Relevant Orders    Comprehensive Metabolic Panel    Lipid Panel    Hemoglobin       Renal/    Stage 3 chronic kidney disease    Relevant Orders    Comprehensive Metabolic Panel    Hemoglobin       Hematology    Long term (current) use of antithrombotics/antiplatelets       Palliative Care    At high risk for falls     Other Visit Diagnoses       Hypertension, unspecified type  (Chronic)       Relevant Medications    amLODIPine (NORVASC) 2.5 MG tablet             Plan:         HOLD AMLODIPINE IF SBP < 120, NO NEED FOR CLONIDINE, HYDRATION, EXPLAINED EFFECT ON THE KIDNEY, WORSENING OVERALL STATUS DUE TO NEUROLOGIC DETERIORATION AND PARKINSON'S DISEASE,ALL CV CLINICALLY STABLE, CLASS 1 ANGINA, NO HF, NO TIA, NO CLINICAL ARRHYTHMIA,CONTINUE CURRENT MEDS, EDUCATION, DIET, EXERCISE , AS MUCH AS POSSIBLE, LEG EXERCISES, DISCUSSED PLAN WITH THE PATIENT AND HIS WIFE, RETURN TO CLINIC 8 MO WITH LABS  Coronary artery disease of native artery of native heart with stable angina pectoris  -     Comprehensive Metabolic Panel; Future; Expected date: 07/17/2024  -     Lipid Panel; Future; Expected date: 07/17/2024  -     Hemoglobin; Future; Expected date: 07/17/2024    Mitral and aortic regurgitation    Stage 3b chronic kidney disease  -     Comprehensive  Metabolic Panel; Future; Expected date: 07/17/2024  -     Hemoglobin; Future; Expected date: 07/17/2024    Hypercholesterolemia  -     Comprehensive Metabolic Panel; Future; Expected date: 07/17/2024  -     Lipid Panel; Future; Expected date: 07/17/2024  -     ezetimibe (ZETIA) 10 mg tablet; Take 1 tablet (10 mg total) by mouth once daily.  Dispense: 90 tablet; Refill: 1    At high risk for falls    Long term (current) use of aspirin    Mild persistent asthma without complication    Hypertension, unspecified type  -     amLODIPine (NORVASC) 2.5 MG tablet; Take 1 tablet (2.5 mg total) by mouth every evening.  Dispense: 90 tablet; Refill: 1    RTC Low level/low impact aerobic exercise 5x's/wk. Heart healthy diet and risk factor modification.    See labs and med orders.    Aerobic exercise 5x's/wk. Heart healthy diet and risk factor modification.    See labs and med orders.

## 2024-02-07 ENCOUNTER — PATIENT MESSAGE (OUTPATIENT)
Dept: RESEARCH | Facility: HOSPITAL | Age: 74
End: 2024-02-07
Payer: MEDICARE

## 2024-04-05 ENCOUNTER — TELEPHONE (OUTPATIENT)
Dept: NEUROLOGY | Facility: CLINIC | Age: 74
End: 2024-04-05
Payer: MEDICARE

## 2024-04-05 NOTE — TELEPHONE ENCOUNTER
----- Message from Ramón Harden sent at 2024 10:25 AM CDT -----  Regardinw hosp f/u Parkinson's care discharge PATRICK to rehab facility [facility]  Type:  HOSP F/U Appointment Request    Caller is requesting a HOSP F/U appointment.      Name of Caller:  PATRICK Scheduling / [facility]     When is the first available appointment?  None in time frame needed.    Symptoms:  1w hosp f/u Parkinson's care discharge PATRICK to rehab facility [facility]    Best Call Back Number:  [phone] mobile / or spouse Marissa at [phone]    Additional Information:  pt has diagnosis of Parkinson's and is former pt of Dr Cassia Paredes

## 2024-04-08 ENCOUNTER — TELEPHONE (OUTPATIENT)
Dept: NEUROLOGY | Facility: CLINIC | Age: 74
End: 2024-04-08
Payer: MEDICARE

## 2024-04-08 NOTE — TELEPHONE ENCOUNTER
Spoke with patients wife and informed we do  not have any providers in [place] that treat PD. Patient established with movement. Provided phone number to call and also sending message to movement to contact patients wife to schedule.

## 2024-04-08 NOTE — TELEPHONE ENCOUNTER
----- Message from Shanna Andersen sent at 4/8/2024  3:49 PM CDT -----  Regarding: Appointment  Contact: Marissa/wife [phone]  Calling to schedule an appointment per referral as soon as possible for PD, only want to see M.D. Please call patient to schedule today.

## 2024-04-08 NOTE — TELEPHONE ENCOUNTER
----- Message from Shanna Andersen sent at 4/8/2024  3:49 PM CDT -----  Regarding: Appointment  Contact: Marissa/wife [phone]  Calling to schedule an appointment per referral as soon as possible for PD, only want to see M.D. Please call patient to schedule today.

## 2024-04-08 NOTE — TELEPHONE ENCOUNTER
Handled in previous encounter; pt was schedule for a VV on 4/29/24 at 10:15 to be seen for PD. Previous pt of [facility]

## 2024-04-08 NOTE — TELEPHONE ENCOUNTER
----- Message from Didi Judge sent at 4/8/2024  8:19 AM CDT -----  Regarding: hosp f/u  Contact: [facility]  Type:  Sooner Appointment Request    Caller is requesting a sooner appointment.  Caller declined first available appointment listed below.  Caller will not accept being placed on the waitlist and is requesting a message be sent to doctor.    Name of Caller:  [facility]   When is the first available appointment?    Symptoms:  hosp f/u dx: parkinson disease  Would the patient rather a call back or a response via [facility]?   Best Call Back Number:  [phone]    Additional Information:  call to be seen thanks.

## 2024-04-08 NOTE — TELEPHONE ENCOUNTER
Spoke to pt's wife to assist in scheduling. Pt was previously seen by Ngozi nicholas in 2021. Appt in 2022 was missed and had not reestablish; Pt wife would like for pt to be reestablish to be seen for his PD.     Scheduled/confirmed a VV for April 29, 2024 at 10:15 AM VV.

## 2024-04-09 DIAGNOSIS — G20.A1 PARKINSON'S DISEASE: Chronic | ICD-10-CM

## 2024-04-09 RX ORDER — PRAVASTATIN SODIUM 40 MG/1
80 TABLET ORAL NIGHTLY
Qty: 180 TABLET | Refills: 1 | Status: SHIPPED | OUTPATIENT
Start: 2024-04-09 | End: 2024-05-09

## 2024-06-18 ENCOUNTER — TELEPHONE (OUTPATIENT)
Dept: CARDIOLOGY | Facility: CLINIC | Age: 74
End: 2024-06-18
Payer: MEDICARE

## 2024-06-18 NOTE — TELEPHONE ENCOUNTER
Spoke with pts wife Marissa. Marissa stated that since pt had been to the ER in the beginning of month, the pts blood pressure has been  elevated and also decreased. Marissa stated that highest the blood pressure has been is 220/110 and pt experienced a headache. Marissa stated that lowest the blood pressure has been is 80/60 and the pt was dizzy. Wife expressed great concern and asking what can be done. I informed wife that I will send a message to Dr. Jama and once he messages me back, that I will give her a call back. Pt wife JOSE J.

## 2024-06-18 NOTE — TELEPHONE ENCOUNTER
----- Message from Meghann Laboy sent at 6/18/2024  3:13 PM CDT -----  Regarding: Needs return call today  Type: Needs Medical Advice  Who Called:  Marissa- Wife    Best Call Back Number: [phone]      Additional Information: Pt is in rehab right now, his blood pressure has been going high and then back low, she wants to talk to someone as soon as possible please call to advise

## 2024-06-19 NOTE — TELEPHONE ENCOUNTER
Spoke with pts wife Marissa. I informed Marissa that per Dr. Jama, Increase amlodipine to 2.5 b.i.d. hold the 2nd dose if systolic blood pressure less than 140 Pt wife JOSE J. Pt wife also requested pt to see Dr. Jama soon. I have made an appointment for Pt on 06/26/24. Pt wife JOSE J.

## 2024-06-20 PROBLEM — Z71.89 ACP (ADVANCE CARE PLANNING): Status: ACTIVE | Noted: 2024-06-20

## 2024-06-21 PROBLEM — Z01.810 PREOP CARDIOVASCULAR EXAM: Status: ACTIVE | Noted: 2024-06-20

## 2024-06-21 PROBLEM — N18.4 CKD (CHRONIC KIDNEY DISEASE), STAGE IV: Chronic | Status: ACTIVE | Noted: 2024-01-01

## 2024-06-21 PROBLEM — S72.302A CLOSED FRACTURE OF SHAFT OF LEFT FEMUR: Status: ACTIVE | Noted: 2024-06-21

## 2024-06-21 PROBLEM — N17.9 AKI (ACUTE KIDNEY INJURY): Status: ACTIVE | Noted: 2024-06-21

## 2024-06-21 PROBLEM — S72.90XA FRACTURE OF FEMUR: Status: ACTIVE | Noted: 2024-06-21

## 2024-06-21 PROBLEM — I25.10 CORONARY ARTERY DISEASE: Status: ACTIVE | Noted: 2018-10-26

## 2024-06-21 PROBLEM — Z71.89 ACP (ADVANCE CARE PLANNING): Status: ACTIVE | Noted: 2024-06-21

## 2024-06-21 PROBLEM — J44.9 COPD (CHRONIC OBSTRUCTIVE PULMONARY DISEASE): Chronic | Status: ACTIVE | Noted: 2024-06-21

## 2024-06-22 PROBLEM — R50.9 FEVER: Status: ACTIVE | Noted: 2024-06-22

## 2024-06-22 PROBLEM — I63.9 LEFT-SIDED CEREBROVASCULAR ACCIDENT (CVA): Status: ACTIVE | Noted: 2024-06-22

## 2024-06-22 PROBLEM — G93.40 ENCEPHALOPATHY ACUTE: Status: ACTIVE | Noted: 2024-01-01

## 2024-06-27 PROBLEM — J44.1 CHRONIC OBSTRUCTIVE PULMONARY DISEASE WITH ACUTE EXACERBATION: Status: ACTIVE | Noted: 2024-01-01

## 2024-06-29 PROBLEM — R13.10 DYSPHAGIA: Status: ACTIVE | Noted: 2017-01-04

## 2024-07-06 PROBLEM — Z01.810 PREOP CARDIOVASCULAR EXAM: Status: RESOLVED | Noted: 2024-01-01 | Resolved: 2024-01-01

## 2024-07-10 ENCOUNTER — TELEPHONE (OUTPATIENT)
Dept: CARDIOLOGY | Facility: CLINIC | Age: 74
End: 2024-07-10
Payer: MEDICARE

## 2024-07-11 DIAGNOSIS — G20.A1 PD (PARKINSON'S DISEASE): ICD-10-CM

## 2024-07-11 DIAGNOSIS — E78.00 HYPERCHOLESTEROLEMIA: Chronic | ICD-10-CM

## 2024-07-11 RX ORDER — EZETIMIBE 10 MG/1
10 TABLET ORAL
Qty: 90 TABLET | Refills: 3 | OUTPATIENT
Start: 2024-07-11

## 2024-07-11 RX ORDER — BENZTROPINE MESYLATE 0.5 MG/1
TABLET ORAL
Qty: 180 TABLET | Refills: 3 | OUTPATIENT
Start: 2024-07-11